# Patient Record
Sex: MALE | Race: WHITE | NOT HISPANIC OR LATINO | Employment: FULL TIME | URBAN - METROPOLITAN AREA
[De-identification: names, ages, dates, MRNs, and addresses within clinical notes are randomized per-mention and may not be internally consistent; named-entity substitution may affect disease eponyms.]

---

## 2020-01-17 ENCOUNTER — APPOINTMENT (OUTPATIENT)
Dept: GENERAL RADIOLOGY | Facility: HOSPITAL | Age: 55
End: 2020-01-17

## 2020-01-17 ENCOUNTER — APPOINTMENT (OUTPATIENT)
Dept: CARDIOLOGY | Facility: HOSPITAL | Age: 55
End: 2020-01-17

## 2020-01-17 ENCOUNTER — TRANSCRIBE ORDERS (OUTPATIENT)
Dept: CARDIAC REHAB | Facility: HOSPITAL | Age: 55
End: 2020-01-17

## 2020-01-17 ENCOUNTER — HOSPITAL ENCOUNTER (INPATIENT)
Facility: HOSPITAL | Age: 55
LOS: 2 days | Discharge: HOME | End: 2020-01-19
Attending: EMERGENCY MEDICINE | Admitting: INTERNAL MEDICINE

## 2020-01-17 DIAGNOSIS — E78.5 HYPERLIPIDEMIA LDL GOAL <70: ICD-10-CM

## 2020-01-17 DIAGNOSIS — I21.21 ST ELEVATION MYOCARDIAL INFARCTION INVOLVING LEFT CIRCUMFLEX CORONARY ARTERY (HCC): Primary | ICD-10-CM

## 2020-01-17 DIAGNOSIS — I21.3 ST ELEVATION MYOCARDIAL INFARCTION (STEMI), UNSPECIFIED ARTERY (HCC): Primary | ICD-10-CM

## 2020-01-17 DIAGNOSIS — I21.3 ST ELEVATION MYOCARDIAL INFARCTION (STEMI), UNSPECIFIED ARTERY (HCC): ICD-10-CM

## 2020-01-17 PROBLEM — I47.29 VENTRICULAR TACHYCARDIA (PAROXYSMAL) (HCC): Status: ACTIVE | Noted: 2020-01-17

## 2020-01-17 PROBLEM — I21.11 ST ELEVATION MYOCARDIAL INFARCTION INVOLVING RIGHT CORONARY ARTERY: Status: ACTIVE | Noted: 2020-01-17

## 2020-01-17 PROBLEM — I47.20 VENTRICULAR TACHYCARDIA (PAROXYSMAL): Status: ACTIVE | Noted: 2020-01-17

## 2020-01-17 LAB
ALBUMIN SERPL-MCNC: 4.4 G/DL (ref 3.5–5.2)
ALBUMIN/GLOB SERPL: 1.5 G/DL
ALP SERPL-CCNC: 64 U/L (ref 39–117)
ALT SERPL W P-5'-P-CCNC: 30 U/L (ref 1–41)
ANION GAP SERPL CALCULATED.3IONS-SCNC: 18 MMOL/L (ref 5–15)
AST SERPL-CCNC: 69 U/L (ref 1–40)
BASOPHILS # BLD AUTO: 0.09 10*3/MM3 (ref 0–0.2)
BASOPHILS NFR BLD AUTO: 0.9 % (ref 0–1.5)
BH CV ECHO MEAS - AO MAX PG (FULL): 2 MMHG
BH CV ECHO MEAS - AO MAX PG: 3.9 MMHG
BH CV ECHO MEAS - AO ROOT AREA (BSA CORRECTED): 2
BH CV ECHO MEAS - AO ROOT AREA: 13.6 CM^2
BH CV ECHO MEAS - AO ROOT DIAM: 4.2 CM
BH CV ECHO MEAS - AO V2 MAX: 98.9 CM/SEC
BH CV ECHO MEAS - AVA(V,A): 3 CM^2
BH CV ECHO MEAS - AVA(V,D): 3 CM^2
BH CV ECHO MEAS - BSA(HAYCOCK): 2 M^2
BH CV ECHO MEAS - BSA: 2 M^2
BH CV ECHO MEAS - BZI_BMI: 25.5 KILOGRAMS/M^2
BH CV ECHO MEAS - BZI_METRIC_HEIGHT: 180.3 CM
BH CV ECHO MEAS - BZI_METRIC_WEIGHT: 83 KG
BH CV ECHO MEAS - EDV(CUBED): 127 ML
BH CV ECHO MEAS - EDV(MOD-SP2): 117 ML
BH CV ECHO MEAS - EDV(MOD-SP4): 124 ML
BH CV ECHO MEAS - EDV(TEICH): 119.7 ML
BH CV ECHO MEAS - EF(CUBED): 66.8 %
BH CV ECHO MEAS - EF(MOD-BP): 61 %
BH CV ECHO MEAS - EF(MOD-SP2): 65.8 %
BH CV ECHO MEAS - EF(MOD-SP4): 53.2 %
BH CV ECHO MEAS - EF(TEICH): 58.1 %
BH CV ECHO MEAS - ESV(CUBED): 42.1 ML
BH CV ECHO MEAS - ESV(MOD-SP2): 40 ML
BH CV ECHO MEAS - ESV(MOD-SP4): 58 ML
BH CV ECHO MEAS - ESV(TEICH): 50.2 ML
BH CV ECHO MEAS - FS: 30.8 %
BH CV ECHO MEAS - IVS/LVPW: 1.1
BH CV ECHO MEAS - IVSD: 0.95 CM
BH CV ECHO MEAS - LA DIMENSION: 3.4 CM
BH CV ECHO MEAS - LA/AO: 0.81
BH CV ECHO MEAS - LAD MAJOR: 6 CM
BH CV ECHO MEAS - LAT PEAK E' VEL: 8.2 CM/SEC
BH CV ECHO MEAS - LATERAL E/E' RATIO: 6.2
BH CV ECHO MEAS - LV DIASTOLIC VOL/BSA (35-75): 61.1 ML/M^2
BH CV ECHO MEAS - LV MASS(C)D: 164.2 GRAMS
BH CV ECHO MEAS - LV MASS(C)DI: 80.8 GRAMS/M^2
BH CV ECHO MEAS - LV MAX PG: 1.9 MMHG
BH CV ECHO MEAS - LV SYSTOLIC VOL/BSA (12-30): 28.6 ML/M^2
BH CV ECHO MEAS - LV V1 MAX: 69.4 CM/SEC
BH CV ECHO MEAS - LVIDD: 5 CM
BH CV ECHO MEAS - LVIDS: 3.5 CM
BH CV ECHO MEAS - LVLD AP2: 8.6 CM
BH CV ECHO MEAS - LVLD AP4: 8.4 CM
BH CV ECHO MEAS - LVLS AP2: 7.5 CM
BH CV ECHO MEAS - LVLS AP4: 7.6 CM
BH CV ECHO MEAS - LVOT AREA (M): 4.2 CM^2
BH CV ECHO MEAS - LVOT AREA: 4.3 CM^2
BH CV ECHO MEAS - LVOT DIAM: 2.3 CM
BH CV ECHO MEAS - LVPWD: 0.89 CM
BH CV ECHO MEAS - MED PEAK E' VEL: 8.5 CM/SEC
BH CV ECHO MEAS - MEDIAL E/E' RATIO: 6
BH CV ECHO MEAS - MV A MAX VEL: 43 CM/SEC
BH CV ECHO MEAS - MV DEC TIME: 0.26 SEC
BH CV ECHO MEAS - MV E MAX VEL: 51.5 CM/SEC
BH CV ECHO MEAS - MV E/A: 1.2
BH CV ECHO MEAS - PA ACC SLOPE: 380.3 CM/SEC^2
BH CV ECHO MEAS - PA ACC TIME: 0.15 SEC
BH CV ECHO MEAS - PA PR(ACCEL): 9.3 MMHG
BH CV ECHO MEAS - RAP SYSTOLE: 3 MMHG
BH CV ECHO MEAS - RVSP: 23 MMHG
BH CV ECHO MEAS - SI(CUBED): 41.8 ML/M^2
BH CV ECHO MEAS - SI(MOD-SP2): 37.9 ML/M^2
BH CV ECHO MEAS - SI(MOD-SP4): 32.5 ML/M^2
BH CV ECHO MEAS - SI(TEICH): 34.3 ML/M^2
BH CV ECHO MEAS - SV(CUBED): 84.9 ML
BH CV ECHO MEAS - SV(MOD-SP2): 77 ML
BH CV ECHO MEAS - SV(MOD-SP4): 66 ML
BH CV ECHO MEAS - SV(TEICH): 69.6 ML
BH CV ECHO MEAS - TAPSE (>1.6): 2.8 CM2
BH CV ECHO MEAS - TR MAX PG: 20 MMHG
BH CV ECHO MEAS - TR MAX VEL: 222 CM/SEC
BH CV ECHO MEAS - TV MAX PG: 15.9 MMHG
BH CV ECHO MEAS - TV V2 MAX: 198.3 CM/SEC
BH CV ECHO MEASUREMENTS AVERAGE E/E' RATIO: 6.17
BH CV VAS BP LEFT ARM: NORMAL MMHG
BH CV XLRA - RV BASE: 3.8 CM
BH CV XLRA - RV LENGTH: 7.3 CM
BH CV XLRA - RV MID: 3.5 CM
BILIRUB SERPL-MCNC: 0.7 MG/DL (ref 0.2–1.2)
BUN BLD-MCNC: 27 MG/DL (ref 6–20)
BUN/CREAT SERPL: 21.8 (ref 7–25)
CALCIUM SPEC-SCNC: 9.7 MG/DL (ref 8.6–10.5)
CHLORIDE SERPL-SCNC: 102 MMOL/L (ref 98–107)
CO2 SERPL-SCNC: 19 MMOL/L (ref 22–29)
CREAT BLD-MCNC: 1.24 MG/DL (ref 0.76–1.27)
DEPRECATED RDW RBC AUTO: 44 FL (ref 37–54)
EOSINOPHIL # BLD AUTO: 0.1 10*3/MM3 (ref 0–0.4)
EOSINOPHIL NFR BLD AUTO: 1.1 % (ref 0.3–6.2)
ERYTHROCYTE [DISTWIDTH] IN BLOOD BY AUTOMATED COUNT: 13 % (ref 12.3–15.4)
GFR SERPL CREATININE-BSD FRML MDRD: 61 ML/MIN/1.73
GLOBULIN UR ELPH-MCNC: 3 GM/DL
GLUCOSE BLD-MCNC: 111 MG/DL (ref 65–99)
HCT VFR BLD AUTO: 43.2 % (ref 37.5–51)
HGB BLD-MCNC: 14.5 G/DL (ref 13–17.7)
HOLD SPECIMEN: NORMAL
HOLD SPECIMEN: NORMAL
IMM GRANULOCYTES # BLD AUTO: 0.02 10*3/MM3 (ref 0–0.05)
IMM GRANULOCYTES NFR BLD AUTO: 0.2 % (ref 0–0.5)
LEFT ATRIUM VOLUME INDEX: 40.4 ML/M^2
LEFT ATRIUM VOLUME: 82 ML
LIPASE SERPL-CCNC: 22 U/L (ref 13–60)
LV EF 2D ECHO EST: 60 %
LYMPHOCYTES # BLD AUTO: 2.95 10*3/MM3 (ref 0.7–3.1)
LYMPHOCYTES NFR BLD AUTO: 31.1 % (ref 19.6–45.3)
MAGNESIUM SERPL-MCNC: 1.7 MG/DL (ref 1.6–2.6)
MCH RBC QN AUTO: 31.1 PG (ref 26.6–33)
MCHC RBC AUTO-ENTMCNC: 33.6 G/DL (ref 31.5–35.7)
MCV RBC AUTO: 92.7 FL (ref 79–97)
MONOCYTES # BLD AUTO: 0.66 10*3/MM3 (ref 0.1–0.9)
MONOCYTES NFR BLD AUTO: 7 % (ref 5–12)
NEUTROPHILS # BLD AUTO: 5.66 10*3/MM3 (ref 1.7–7)
NEUTROPHILS NFR BLD AUTO: 59.7 % (ref 42.7–76)
NRBC BLD AUTO-RTO: 0 /100 WBC (ref 0–0.2)
NT-PROBNP SERPL-MCNC: 41.5 PG/ML (ref 5–900)
PLATELET # BLD AUTO: 201 10*3/MM3 (ref 140–450)
PMV BLD AUTO: 11.5 FL (ref 6–12)
POTASSIUM BLD-SCNC: 4.2 MMOL/L (ref 3.5–5.2)
PROT SERPL-MCNC: 7.4 G/DL (ref 6–8.5)
RBC # BLD AUTO: 4.66 10*6/MM3 (ref 4.14–5.8)
SODIUM BLD-SCNC: 139 MMOL/L (ref 136–145)
TROPONIN T SERPL-MCNC: 0.38 NG/ML (ref 0–0.03)
TROPONIN T SERPL-MCNC: <0.01 NG/ML (ref 0–0.03)
WBC NRBC COR # BLD: 9.48 10*3/MM3 (ref 3.4–10.8)
WHOLE BLOOD HOLD SPECIMEN: NORMAL
WHOLE BLOOD HOLD SPECIMEN: NORMAL

## 2020-01-17 PROCEDURE — 83735 ASSAY OF MAGNESIUM: CPT | Performed by: INTERNAL MEDICINE

## 2020-01-17 PROCEDURE — B2111ZZ FLUOROSCOPY OF MULTIPLE CORONARY ARTERIES USING LOW OSMOLAR CONTRAST: ICD-10-PCS | Performed by: INTERNAL MEDICINE

## 2020-01-17 PROCEDURE — 25010000003 LIDOCAINE 1 % SOLUTION: Performed by: INTERNAL MEDICINE

## 2020-01-17 PROCEDURE — 83690 ASSAY OF LIPASE: CPT | Performed by: EMERGENCY MEDICINE

## 2020-01-17 PROCEDURE — 25010000002 HEPARIN (PORCINE) PER 1000 UNITS: Performed by: EMERGENCY MEDICINE

## 2020-01-17 PROCEDURE — 92941 PRQ TRLML REVSC TOT OCCL AMI: CPT | Performed by: INTERNAL MEDICINE

## 2020-01-17 PROCEDURE — 93005 ELECTROCARDIOGRAM TRACING: CPT | Performed by: EMERGENCY MEDICINE

## 2020-01-17 PROCEDURE — 92973 PRQ TRLUML C MCHN ASP THRMBC: CPT | Performed by: INTERNAL MEDICINE

## 2020-01-17 PROCEDURE — C1887 CATHETER, GUIDING: HCPCS | Performed by: INTERNAL MEDICINE

## 2020-01-17 PROCEDURE — 25010000002 AMIODARONE IN DEXTROSE 5% 150-4.21 MG/100ML-% SOLUTION: Performed by: NURSE PRACTITIONER

## 2020-01-17 PROCEDURE — 80053 COMPREHEN METABOLIC PANEL: CPT | Performed by: EMERGENCY MEDICINE

## 2020-01-17 PROCEDURE — 85025 COMPLETE CBC W/AUTO DIFF WBC: CPT | Performed by: EMERGENCY MEDICINE

## 2020-01-17 PROCEDURE — 71045 X-RAY EXAM CHEST 1 VIEW: CPT

## 2020-01-17 PROCEDURE — 93306 TTE W/DOPPLER COMPLETE: CPT | Performed by: INTERNAL MEDICINE

## 2020-01-17 PROCEDURE — 84484 ASSAY OF TROPONIN QUANT: CPT | Performed by: INTERNAL MEDICINE

## 2020-01-17 PROCEDURE — C1769 GUIDE WIRE: HCPCS | Performed by: INTERNAL MEDICINE

## 2020-01-17 PROCEDURE — C1757 CATH, THROMBECTOMY/EMBOLECT: HCPCS | Performed by: INTERNAL MEDICINE

## 2020-01-17 PROCEDURE — 25010000002 FENTANYL CITRATE (PF) 100 MCG/2ML SOLUTION: Performed by: INTERNAL MEDICINE

## 2020-01-17 PROCEDURE — 25010000002 PHENYLEPHRINE PER 1 ML: Performed by: INTERNAL MEDICINE

## 2020-01-17 PROCEDURE — C1874 STENT, COATED/COV W/DEL SYS: HCPCS | Performed by: INTERNAL MEDICINE

## 2020-01-17 PROCEDURE — 85347 COAGULATION TIME ACTIVATED: CPT

## 2020-01-17 PROCEDURE — 25010000002 EPTIFIBATIDE 20 MG/10ML SOLUTION: Performed by: INTERNAL MEDICINE

## 2020-01-17 PROCEDURE — 99222 1ST HOSP IP/OBS MODERATE 55: CPT | Performed by: INTERNAL MEDICINE

## 2020-01-17 PROCEDURE — 25010000002 MORPHINE PER 10 MG: Performed by: EMERGENCY MEDICINE

## 2020-01-17 PROCEDURE — 83880 ASSAY OF NATRIURETIC PEPTIDE: CPT | Performed by: EMERGENCY MEDICINE

## 2020-01-17 PROCEDURE — 93306 TTE W/DOPPLER COMPLETE: CPT

## 2020-01-17 PROCEDURE — 027034Z DILATION OF CORONARY ARTERY, ONE ARTERY WITH DRUG-ELUTING INTRALUMINAL DEVICE, PERCUTANEOUS APPROACH: ICD-10-PCS | Performed by: INTERNAL MEDICINE

## 2020-01-17 PROCEDURE — 99284 EMERGENCY DEPT VISIT MOD MDM: CPT

## 2020-01-17 PROCEDURE — C1725 CATH, TRANSLUMIN NON-LASER: HCPCS | Performed by: INTERNAL MEDICINE

## 2020-01-17 PROCEDURE — 25010000002 HEPARIN (PORCINE) PER 1000 UNITS: Performed by: INTERNAL MEDICINE

## 2020-01-17 PROCEDURE — B2151ZZ FLUOROSCOPY OF LEFT HEART USING LOW OSMOLAR CONTRAST: ICD-10-PCS | Performed by: INTERNAL MEDICINE

## 2020-01-17 PROCEDURE — 93005 ELECTROCARDIOGRAM TRACING: CPT | Performed by: NURSE PRACTITIONER

## 2020-01-17 PROCEDURE — 0 IOPAMIDOL PER 1 ML: Performed by: INTERNAL MEDICINE

## 2020-01-17 PROCEDURE — 25010000002 ATROPINE PER 0.01 MG: Performed by: INTERNAL MEDICINE

## 2020-01-17 PROCEDURE — 93458 L HRT ARTERY/VENTRICLE ANGIO: CPT | Performed by: INTERNAL MEDICINE

## 2020-01-17 PROCEDURE — 25010000002 MIDAZOLAM PER 1 MG: Performed by: INTERNAL MEDICINE

## 2020-01-17 PROCEDURE — 25010000002 AMIODARONE IN DEXTROSE 5% 360-4.14 MG/200ML-% SOLUTION: Performed by: NURSE PRACTITIONER

## 2020-01-17 PROCEDURE — 4A023N7 MEASUREMENT OF CARDIAC SAMPLING AND PRESSURE, LEFT HEART, PERCUTANEOUS APPROACH: ICD-10-PCS | Performed by: INTERNAL MEDICINE

## 2020-01-17 PROCEDURE — 84484 ASSAY OF TROPONIN QUANT: CPT | Performed by: EMERGENCY MEDICINE

## 2020-01-17 PROCEDURE — C9606 PERC D-E COR REVASC W AMI S: HCPCS | Performed by: INTERNAL MEDICINE

## 2020-01-17 PROCEDURE — 36415 COLL VENOUS BLD VENIPUNCTURE: CPT

## 2020-01-17 PROCEDURE — C1894 INTRO/SHEATH, NON-LASER: HCPCS | Performed by: INTERNAL MEDICINE

## 2020-01-17 PROCEDURE — 93005 ELECTROCARDIOGRAM TRACING: CPT | Performed by: INTERNAL MEDICINE

## 2020-01-17 DEVICE — XIENCE SIERRA™ EVEROLIMUS ELUTING CORONARY STENT SYSTEM 4.00 MM X 23 MM / RAPID-EXCHANGE
Type: IMPLANTABLE DEVICE | Status: FUNCTIONAL
Brand: XIENCE SIERRA™

## 2020-01-17 DEVICE — XIENCE SIERRA™ EVEROLIMUS ELUTING CORONARY STENT SYSTEM 4.00 MM X 38 MM / RAPID-EXCHANGE
Type: IMPLANTABLE DEVICE | Status: FUNCTIONAL
Brand: XIENCE SIERRA™

## 2020-01-17 RX ORDER — ASPIRIN 81 MG/1
81 TABLET, CHEWABLE ORAL DAILY
Status: DISCONTINUED | OUTPATIENT
Start: 2020-01-17 | End: 2020-01-19 | Stop reason: HOSPADM

## 2020-01-17 RX ORDER — SODIUM CHLORIDE 0.9 % (FLUSH) 0.9 %
10 SYRINGE (ML) INJECTION AS NEEDED
Status: DISCONTINUED | OUTPATIENT
Start: 2020-01-17 | End: 2020-01-19 | Stop reason: HOSPADM

## 2020-01-17 RX ORDER — FENTANYL CITRATE 50 UG/ML
INJECTION, SOLUTION INTRAMUSCULAR; INTRAVENOUS AS NEEDED
Status: DISCONTINUED | OUTPATIENT
Start: 2020-01-17 | End: 2020-01-17 | Stop reason: HOSPADM

## 2020-01-17 RX ORDER — HEPARIN SODIUM 5000 [USP'U]/ML
INJECTION, SOLUTION INTRAVENOUS; SUBCUTANEOUS
Status: COMPLETED | OUTPATIENT
Start: 2020-01-17 | End: 2020-01-17

## 2020-01-17 RX ORDER — LISINOPRIL 2.5 MG/1
2.5 TABLET ORAL
Status: DISCONTINUED | OUTPATIENT
Start: 2020-01-17 | End: 2020-01-18

## 2020-01-17 RX ORDER — SODIUM CHLORIDE 0.9 % (FLUSH) 0.9 %
10 SYRINGE (ML) INJECTION EVERY 12 HOURS SCHEDULED
Status: DISCONTINUED | OUTPATIENT
Start: 2020-01-17 | End: 2020-01-19 | Stop reason: HOSPADM

## 2020-01-17 RX ORDER — PRASUGREL 5 MG/1
TABLET, FILM COATED ORAL AS NEEDED
Status: DISCONTINUED | OUTPATIENT
Start: 2020-01-17 | End: 2020-01-17 | Stop reason: HOSPADM

## 2020-01-17 RX ORDER — MIDAZOLAM HYDROCHLORIDE 1 MG/ML
INJECTION INTRAMUSCULAR; INTRAVENOUS AS NEEDED
Status: DISCONTINUED | OUTPATIENT
Start: 2020-01-17 | End: 2020-01-17 | Stop reason: HOSPADM

## 2020-01-17 RX ORDER — CLOPIDOGREL BISULFATE 75 MG/1
TABLET ORAL
Status: COMPLETED | OUTPATIENT
Start: 2020-01-17 | End: 2020-01-17

## 2020-01-17 RX ORDER — ZOLPIDEM TARTRATE 5 MG/1
10 TABLET ORAL NIGHTLY PRN
Status: DISCONTINUED | OUTPATIENT
Start: 2020-01-17 | End: 2020-01-19 | Stop reason: HOSPADM

## 2020-01-17 RX ORDER — POTASSIUM CHLORIDE 750 MG/1
40 CAPSULE, EXTENDED RELEASE ORAL AS NEEDED
Status: DISCONTINUED | OUTPATIENT
Start: 2020-01-17 | End: 2020-01-19 | Stop reason: HOSPADM

## 2020-01-17 RX ORDER — MAGNESIUM SULFATE HEPTAHYDRATE 40 MG/ML
4 INJECTION, SOLUTION INTRAVENOUS AS NEEDED
Status: DISCONTINUED | OUTPATIENT
Start: 2020-01-17 | End: 2020-01-19 | Stop reason: HOSPADM

## 2020-01-17 RX ORDER — MAGNESIUM SULFATE HEPTAHYDRATE 40 MG/ML
2 INJECTION, SOLUTION INTRAVENOUS AS NEEDED
Status: DISCONTINUED | OUTPATIENT
Start: 2020-01-17 | End: 2020-01-19 | Stop reason: HOSPADM

## 2020-01-17 RX ORDER — EPTIFIBATIDE 2 MG/ML
INJECTION, SOLUTION INTRAVENOUS AS NEEDED
Status: DISCONTINUED | OUTPATIENT
Start: 2020-01-17 | End: 2020-01-17 | Stop reason: HOSPADM

## 2020-01-17 RX ORDER — HEPARIN SODIUM 1000 [USP'U]/ML
INJECTION, SOLUTION INTRAVENOUS; SUBCUTANEOUS AS NEEDED
Status: DISCONTINUED | OUTPATIENT
Start: 2020-01-17 | End: 2020-01-17 | Stop reason: HOSPADM

## 2020-01-17 RX ORDER — NITROGLYCERIN 0.4 MG/1
TABLET SUBLINGUAL
Status: COMPLETED | OUTPATIENT
Start: 2020-01-17 | End: 2020-01-17

## 2020-01-17 RX ORDER — ALPRAZOLAM 0.5 MG/1
0.5 TABLET ORAL EVERY 8 HOURS PRN
Status: DISCONTINUED | OUTPATIENT
Start: 2020-01-17 | End: 2020-01-19 | Stop reason: HOSPADM

## 2020-01-17 RX ORDER — PRASUGREL 10 MG/1
10 TABLET, FILM COATED ORAL DAILY
Status: DISCONTINUED | OUTPATIENT
Start: 2020-01-18 | End: 2020-01-19 | Stop reason: HOSPADM

## 2020-01-17 RX ORDER — NITROGLYCERIN 0.4 MG/1
0.4 TABLET SUBLINGUAL
Status: DISCONTINUED | OUTPATIENT
Start: 2020-01-17 | End: 2020-01-19 | Stop reason: HOSPADM

## 2020-01-17 RX ORDER — ATROPINE SULFATE 1 MG/ML
INJECTION, SOLUTION INTRAMUSCULAR; INTRAVENOUS; SUBCUTANEOUS AS NEEDED
Status: DISCONTINUED | OUTPATIENT
Start: 2020-01-17 | End: 2020-01-17 | Stop reason: HOSPADM

## 2020-01-17 RX ORDER — LIDOCAINE HYDROCHLORIDE 10 MG/ML
INJECTION, SOLUTION INFILTRATION; PERINEURAL AS NEEDED
Status: DISCONTINUED | OUTPATIENT
Start: 2020-01-17 | End: 2020-01-17 | Stop reason: HOSPADM

## 2020-01-17 RX ORDER — SODIUM CHLORIDE 9 MG/ML
3 INJECTION, SOLUTION INTRAVENOUS CONTINUOUS
Status: DISCONTINUED | OUTPATIENT
Start: 2020-01-17 | End: 2020-01-17

## 2020-01-17 RX ORDER — MORPHINE SULFATE 4 MG/ML
4 INJECTION, SOLUTION INTRAMUSCULAR; INTRAVENOUS ONCE
Status: COMPLETED | OUTPATIENT
Start: 2020-01-17 | End: 2020-01-17

## 2020-01-17 RX ORDER — ACETAMINOPHEN 325 MG/1
650 TABLET ORAL EVERY 4 HOURS PRN
Status: DISCONTINUED | OUTPATIENT
Start: 2020-01-17 | End: 2020-01-19 | Stop reason: HOSPADM

## 2020-01-17 RX ORDER — ASPIRIN 81 MG/1
TABLET, CHEWABLE ORAL
Status: COMPLETED | OUTPATIENT
Start: 2020-01-17 | End: 2020-01-17

## 2020-01-17 RX ORDER — ASPIRIN 81 MG/1
324 TABLET, CHEWABLE ORAL ONCE
Status: DISCONTINUED | OUTPATIENT
Start: 2020-01-17 | End: 2020-01-17

## 2020-01-17 RX ORDER — HYDROCODONE BITARTRATE AND ACETAMINOPHEN 7.5; 325 MG/1; MG/1
1 TABLET ORAL EVERY 4 HOURS PRN
Status: DISCONTINUED | OUTPATIENT
Start: 2020-01-17 | End: 2020-01-19 | Stop reason: HOSPADM

## 2020-01-17 RX ORDER — POTASSIUM CHLORIDE 1.5 G/1.77G
40 POWDER, FOR SOLUTION ORAL AS NEEDED
Status: DISCONTINUED | OUTPATIENT
Start: 2020-01-17 | End: 2020-01-19 | Stop reason: HOSPADM

## 2020-01-17 RX ORDER — ROSUVASTATIN CALCIUM 20 MG/1
20 TABLET, COATED ORAL NIGHTLY
Status: DISCONTINUED | OUTPATIENT
Start: 2020-01-17 | End: 2020-01-19 | Stop reason: HOSPADM

## 2020-01-17 RX ADMIN — AMIODARONE HYDROCHLORIDE 0.5 MG/MIN: 1.8 INJECTION, SOLUTION INTRAVENOUS at 21:01

## 2020-01-17 RX ADMIN — ASPIRIN 81 MG CHEWABLE TABLET 324 MG: 81 TABLET CHEWABLE at 05:54

## 2020-01-17 RX ADMIN — MORPHINE SULFATE 4 MG: 4 INJECTION, SOLUTION INTRAMUSCULAR; INTRAVENOUS at 06:34

## 2020-01-17 RX ADMIN — AMIODARONE HYDROCHLORIDE 1 MG/MIN: 1.8 INJECTION, SOLUTION INTRAVENOUS at 12:32

## 2020-01-17 RX ADMIN — NITROGLYCERIN 0.4 MG: 0.4 TABLET SUBLINGUAL at 06:12

## 2020-01-17 RX ADMIN — CLOPIDOGREL BISULFATE 600 MG: 75 TABLET ORAL at 05:54

## 2020-01-17 RX ADMIN — MAGNESIUM SULFATE 4 G: 4 INJECTION INTRAVENOUS at 12:47

## 2020-01-17 RX ADMIN — HEPARIN SODIUM 4992 UNITS: 5000 INJECTION, SOLUTION INTRAVENOUS; SUBCUTANEOUS at 06:09

## 2020-01-17 RX ADMIN — SODIUM CHLORIDE, PRESERVATIVE FREE 10 ML: 5 INJECTION INTRAVENOUS at 21:01

## 2020-01-17 RX ADMIN — ROSUVASTATIN CALCIUM 20 MG: 20 TABLET, COATED ORAL at 21:01

## 2020-01-17 RX ADMIN — AMIODARONE HYDROCHLORIDE 150 MG: 1.5 INJECTION, SOLUTION INTRAVENOUS at 12:17

## 2020-01-17 RX ADMIN — SODIUM CHLORIDE 500 ML: 9 INJECTION, SOLUTION INTRAVENOUS at 12:46

## 2020-01-17 RX ADMIN — AMIODARONE HYDROCHLORIDE 1 MG/MIN: 1.8 INJECTION, SOLUTION INTRAVENOUS at 17:20

## 2020-01-17 NOTE — NURSING NOTE
At around 0930 patient began having frequent PVCs and short runs of VT. Patient was asymptomatic with episodes. EKG was obtained at 1007. Notified APRN.  Around 1145 patient had 24 beat run of VT. Notified APRN. Amio gtt started, Mag replaced and NS bolus infused per orders. EKG obtained. Patient had also been having episodes of accelerated idioventricular rhythm as well and he had some complaints of palpitations. Another small VT run occurred around 1400. EKG obtained. Notified APRN. PA showed up to bedside and review rhythms and speak with patient around 1420. At 1448, Dr. Cox called nursing. It was determined that he is experiencing re-perfusion rhythm and APRN should not be paged anymore. All of this is also documented in Adult PCS system charting. Will continue to monitor.

## 2020-01-17 NOTE — PROGRESS NOTES
Pt. Referred for Phase II Cardiac Rehab. Staff discussed benefits of exercise, program protocol, and educational material provided. Teach back verified.  Patient scheduled for orientation at [facility] on Friday 2/7/2020 at 1300.

## 2020-01-17 NOTE — ED PROVIDER NOTES
"Subjective   54-year-old male, otherwise healthy, presents for evaluation of chest pain.  Of note, the patient's only cardiac risk factors are strong family history and being a former smoker.  He states that this morning between 415 and 430 he was working out when he began experiencing central chest pain that he describes as a \"pressure.\"  He states that he had some mild pain in his left jaw and left hand as well.  He endorses accompanying shortness of breath, too.  No nausea or vomiting.  No diaphoresis.  He states that the pain was persistent, prompting his visit to the emergency department this morning.  No similar episodes before in the past.          Review of Systems   Respiratory: Positive for shortness of breath.    Cardiovascular: Positive for chest pain.   All other systems reviewed and are negative.      History reviewed. No pertinent past medical history.    Allergies   Allergen Reactions   • Erythromycin        Past Surgical History:   Procedure Laterality Date   • ROTATOR CUFF REPAIR Left        History reviewed. No pertinent family history.    Social History     Socioeconomic History   • Marital status:      Spouse name: Not on file   • Number of children: Not on file   • Years of education: Not on file   • Highest education level: Not on file   Tobacco Use   • Smoking status: Former Smoker   • Smokeless tobacco: Never Used   Substance and Sexual Activity   • Alcohol use: Defer   • Drug use: No   • Sexual activity: Defer           Objective   Physical Exam   Constitutional: He is oriented to person, place, and time. He appears well-developed and well-nourished. No distress.   Nontoxic-appearing male   HENT:   Head: Normocephalic and atraumatic.   Mouth/Throat: Oropharynx is clear and moist.   Neck: Normal range of motion. No JVD present.   Cardiovascular: Normal rate, regular rhythm, normal heart sounds, intact distal pulses and normal pulses. Exam reveals no gallop and no friction rub.   No " murmur heard.  Pulmonary/Chest: Effort normal and breath sounds normal. No respiratory distress. He has no wheezes. He has no rales.   Abdominal: Soft. Bowel sounds are normal. He exhibits no distension and no mass. There is no tenderness. There is no guarding.   Musculoskeletal: Normal range of motion.        Right lower leg: He exhibits no edema.        Left lower leg: He exhibits no edema.   Neurological: He is alert and oriented to person, place, and time.   Skin: Skin is warm and dry. No rash noted. He is not diaphoretic. No erythema. No pallor.   Psychiatric: He has a normal mood and affect. Judgment and thought content normal.   Nursing note and vitals reviewed.      Critical Care  Performed by: Encoh Castillo MD  Authorized by: Enoch Castillo MD     Critical care provider statement:     Critical care time (minutes):  35    Critical care was necessary to treat or prevent imminent or life-threatening deterioration of the following conditions:  Cardiac failure    Critical care was time spent personally by me on the following activities:  Development of treatment plan with patient or surrogate, discussions with consultants, evaluation of patient's response to treatment, examination of patient, obtaining history from patient or surrogate, ordering and performing treatments and interventions, ordering and review of laboratory studies, ordering and review of radiographic studies, pulse oximetry and re-evaluation of patient's condition               ED Course  ED Course as of Jan 17 0614 Fri Jan 17, 2020   0613 54-year-old male presents for evaluation of chest pain this morning.  Of note, the patient has cardiac risk factors of strong family history and is a former smoker.  On arrival to the ED, patient nontoxic-appearing.  EKG revealed sinus bradycardia with sinus arrhythmia and ST elevation and inferior and lateral precordial leads with reciprocal depression in anterior precordial leads consistent  with STEMI.  STEMI protocol initiated.  Patient given aspirin, Plavix, and heparin.  Nitroglycerin given as well.  Labs and chest x-ray are currently pending.  I discussed the patient's case with Dr. Cox, and the patient will be taken immediately to the Cath Lab for definitive management of his STEMI.  He is hemodynamically stable at this time and aware/agreeable with the plan.    [DD]      ED Course User Index  [DD] Enoch Castillo MD                      E[place] Coma Scale Score: 15                  Recent Results (from the past 24 hour(s))   CBC Auto Differential    Collection Time: 01/17/20  5:53 AM   Result Value Ref Range    WBC 9.48 3.40 - 10.80 10*3/mm3    RBC 4.66 4.14 - 5.80 10*6/mm3    Hemoglobin 14.5 13.0 - 17.7 g/dL    Hematocrit 43.2 37.5 - 51.0 %    MCV 92.7 79.0 - 97.0 fL    MCH 31.1 26.6 - 33.0 pg    MCHC 33.6 31.5 - 35.7 g/dL    RDW 13.0 12.3 - 15.4 %    RDW-SD 44.0 37.0 - 54.0 fl    MPV 11.5 6.0 - 12.0 fL    Platelets 201 140 - 450 10*3/mm3    Neutrophil % 59.7 42.7 - 76.0 %    Lymphocyte % 31.1 19.6 - 45.3 %    Monocyte % 7.0 5.0 - 12.0 %    Eosinophil % 1.1 0.3 - 6.2 %    Basophil % 0.9 0.0 - 1.5 %    Immature Grans % 0.2 0.0 - 0.5 %    Neutrophils, Absolute 5.66 1.70 - 7.00 10*3/mm3    Lymphocytes, Absolute 2.95 0.70 - 3.10 10*3/mm3    Monocytes, Absolute 0.66 0.10 - 0.90 10*3/mm3    Eosinophils, Absolute 0.10 0.00 - 0.40 10*3/mm3    Basophils, Absolute 0.09 0.00 - 0.20 10*3/mm3    Immature Grans, Absolute 0.02 0.00 - 0.05 10*3/mm3    nRBC 0.0 0.0 - 0.2 /100 WBC     Note: In addition to lab results from this visit, the labs listed above may include labs taken at another facility or during a different encounter within the last 24 hours. Please correlate lab times with ED admission and discharge times for further clarification of the services performed during this visit.    XR Chest 1 View    (Results Pending)     Vitals:    01/17/20 0601 01/17/20 0604 01/17/20 0611 01/17/20 0614   BP:   (!) 142/102  134/92   BP Location:       Patient Position:       Pulse: 68 54 72 57   Resp:  18  14   Temp:  97.5 °F (36.4 °C)     TempSrc:       SpO2: 100% 100%  100%   Weight:       Height:         Medications   sodium chloride 0.9 % flush 10 mL (has no administration in time range)   aspirin chewable tablet 324 mg (has no administration in time range)   heparin (porcine) 5000 UNIT/ML injection (4,992 Units Intravenous Given 1/17/20 0609)   nitroglycerin (NITROSTAT) SL tablet (0.4 mg Sublingual Given 1/17/20 0612)   nitroglycerin (NITROSTAT) SL tablet 0.4 mg (has no administration in time range)   Morphine sulfate (PF) injection 4 mg (has no administration in time range)   aspirin chewable tablet (324 mg Oral Given 1/17/20 0554)   clopidogrel (PLAVIX) tablet (600 mg Oral Given 1/17/20 0554)     ECG/EMG Results (last 24 hours)     Procedure Component Value Units Date/Time    ECG 12 Lead [[id number]] Collected:  01/17/20 0550     Updated:  01/17/20 0551        ECG 12 Lead                     [facility]    Final diagnoses:   ST elevation myocardial infarction (STEMI), unspecified artery (CMS/HCC)            Enoch Castillo MD  01/17/20 0615

## 2020-01-17 NOTE — H&P
Cardiology H & P Note  [facility]      Chief Complaint/Reason for service:    · STEMI        IDENTIFICATION: 54-year-old gentleman from [facility] Problems    Diagnosis POA   • **ST elevation myocardial infarction involving left circumflex coronary artery (CMS/HCC) [I21.21] Yes     Priority: High     · Cardiac catheterization for inferior lateral STEMI (1/17/2020): 100% thrombotic occlusion of the mid circumflex status post overlapping Xience OTONIEL.  Mild disease of LAD/RCA.  LVEF 42%     • Hyperlipidemia LDL goal <70 [E78.5] Yes     · High intensity statin therapy indicated given the presence of CAD                54-year-old gentleman with no prior cardiac history presented to [facility] emergency room around 0540 with complaints of acute severe substernal chest pain while working out.  In the ER, he was found to have inferior lateral ST elevation on EKG.  Code AMI was activated.  The patient was given aspirin, clopidogrel 600 mg loading dose.    The patient was taken emergently to the cardiac catheterization lab.  He was found to have 100% occlusion of large left circumflex with a large thrombus burden present.  Thrombus was treated with mechanical thrombectomy and 2 drug-eluting stents were placed with excellent final results.  LVEF 42%    Allergies   Allergen Reactions   • Erythromycin        History reviewed. No pertinent past medical history.    Past Surgical History:   Procedure Laterality Date   • ROTATOR CUFF REPAIR Left        History reviewed. No pertinent family history.    Social History     Tobacco Use   Smoking Status Former Smoker   Smokeless Tobacco Never Used       Social History     Substance and Sexual Activity   Alcohol Use Defer         Review of Systems:   Deferred due to the emergent nature of the procedure.         Vital Sign Min/Max for last 24 hours  Temp  Min: 97.5 °F (36.4 °C)  Max: 97.5 °F (36.4 °C)   BP  Min: 100/66  Max:  142/102   Pulse  Min: 50  Max: 82   Resp  Min: 14  Max: 20   SpO2  Min: 96 %  Max: 100 %   No data recorded    No intake or output data in the 24 hours ending 01/17/20 0758        Physical Exam   Constitutional: He is oriented to person, place, and time. He appears well-developed and well-nourished.   HENT:   Head: Normocephalic and atraumatic.   Eyes: Conjunctivae are normal. No scleral icterus.   Neck: Normal range of motion. No JVD present. Carotid bruit is not present. No thyromegaly present.   Cardiovascular: Normal rate and regular rhythm. Exam reveals no gallop.   No murmur heard.  Pulmonary/Chest: Effort normal and breath sounds normal.   Abdominal: Soft. He exhibits no distension and no mass. There is no hepatosplenomegaly.   Musculoskeletal: He exhibits no edema.   Neurological: He is alert and oriented to person, place, and time.   Skin: Skin is warm and dry. No rash noted.   Psychiatric: He has a normal mood and affect. His behavior is normal.        Tele: Sinus    DATA REVIEW:    EKG (1/17/2020): Sinus with ST elevation in leads II, III, aVF and V5/V6      Results from last 7 days   Lab Units 01/17/20  0553   SODIUM mmol/L 139   POTASSIUM mmol/L 4.2   CHLORIDE mmol/L 102   BUN mg/dL 27*   CREATININE mg/dL 1.24     Results from last 7 days   Lab Units 01/17/20  0553   TROPONIN T ng/mL <0.010     Results from last 7 days   Lab Units 01/17/20  0553   WBC 10*3/mm3 9.48   HEMOGLOBIN g/dL 14.5   HEMATOCRIT % 43.2   PLATELETS 10*3/mm3 201              STEMI involving left circumflex coronary artery  · status post mechanical thrombectomy and OTONIEL to the left circumflex, 1/17/2020  · DAPT with aspirin and Prasugrel until 2/2021  · Attempt low-dose metoprolol and ACE inhibitor therapy    Hyperlipidemia with goal LDL less than 70  · Start rosuvastatin 20 mg nightly    Ischemic cardiomyopathy with LVEF 42%  · Start low-dose metoprolol and lisinopril  · Transition to metoprolol succinate if blood pressure/heart rate  tolerate         · Admit to telemetry  · DAPT  · Metoprolol 12.5 mg twice daily, hold for SBP <100 or HR <55  · Lisinopril 2.5 mg daily, hold for SBP <110 mmHg  · Echo  · Discharge home in 1-2 days    Shakeel Cox IV, MD  1/17/2020

## 2020-01-17 NOTE — PROGRESS NOTES
Discharge Planning Assessment  [facility]     Patient Name: Manuel Sauceda  MRN: [id number]  Today's Date: 1/17/2020    Admit Date: 1/17/2020    Discharge Needs Assessment     Row Name 01/17/20 1154       Living Environment    Lives With  spouse    Current Living Arrangements  home/apartment/condo    Primary Care Provided by  self    Provides Primary Care For  no one    Family Caregiver if Needed  none    Quality of Family Relationships  helpful;involved    Able to Return to Prior Arrangements  yes       Resource/Environmental Concerns    Resource/Environmental Concerns  none    Transportation Concerns  car, none       Transition Planning    Patient/Family Anticipates Transition to  home;home with family    Patient/Family Anticipated Services at Transition  none    Transportation Anticipated  family or friend will provide       Discharge Needs Assessment    Readmission Within the Last 30 Days  no previous admission in last 30 days    Concerns to be Addressed  discharge planning    Equipment Currently Used at Home  none    Anticipated Changes Related to Illness  none    Equipment Needed After Discharge  none        Discharge Plan     Row Name 01/17/20 1154       Plan    Plan  Home at discharge     Patient/Family in Agreement with Plan  yes    Plan Comments  Met with patient at bedside to discuss discharge planning - He states he is independent of ADL's and lives withi his spouse in [facility]. He denies the use of any DME at home and has no identified needs for home health. Cardiac rehab team has seen the patient and arranged appointment for Cardiac Rehab. He is in need of a PCP, spoke with Rose at [facility], she can not arrange a new patient appointment until day of discharge but will be following, and if he discharges over the weekend, they will arrange a PCP appointment during his transitional care call. No other needs at this time- rachel [phone]         Destination      Coordination has not been started for this  encounter.      Durable Medical Equipment      Coordination has not been started for this encounter.      Dialysis/Infusion      Coordination has not been started for this encounter.      Home Medical Care      Coordination has not been started for this encounter.      Therapy      Coordination has not been started for this encounter.      Community Resources      Coordination has not been started for this encounter.        Expected Discharge Date and Time     Expected Discharge Date Expected Discharge Time    Jan 19, 2020         Demographic Summary     Row Name 01/17/20 1152       General Information    Admission Type  inpatient    Arrived From  home    Referral Source  admission list    Reason for Consult  discharge planning    Preferred Language  English       Contact Information    Permission Granted to Share Info With          Functional Status     Row Name 01/17/20 1154       Functional Status    Usual Activity Tolerance  good    Current Activity Tolerance  good       Functional Status, IADL    Medications  independent    Meal Preparation  independent    Housekeeping  independent    Laundry  independent    Shopping  independent       Mental Status    General Appearance WDL  WDL       Mental Status Summary    Recent Changes in Mental Status/Cognitive Functioning  no changes        Psychosocial    No documentation.       Abuse/Neglect    No documentation.       Legal    No documentation.       Substance Abuse    No documentation.       Patient Forms    No documentation.           Roxy Sinclair RN

## 2020-01-17 NOTE — PROGRESS NOTES
Cardiology update:    Nursing for this patient has contacted my APRN several occasions today for concerns of wide-complex tachycardia.  The patient has had several salvos of short-lived wide-complex rhythm which appears to be idioventricular rhythm.  He has had some short nonsustained VT.  The patient reports some occasional palpitations but otherwise feels well.    Amiodarone has been initiated by my APRN and should be continued unless patient develops low heart rate or blood pressure as a result.    YOSI Cox MD [facility], [facility]  Interventional and General Cardiology

## 2020-01-17 NOTE — PLAN OF CARE
SBP on lower end, kary with PVCs/accelerated idioventricular rhythm present. Denies pain. Radial site without complications. Will monitor.

## 2020-01-18 LAB
ANION GAP SERPL CALCULATED.3IONS-SCNC: 10 MMOL/L (ref 5–15)
BUN BLD-MCNC: 15 MG/DL (ref 6–20)
BUN/CREAT SERPL: 15.3 (ref 7–25)
CALCIUM SPEC-SCNC: 9.1 MG/DL (ref 8.6–10.5)
CHLORIDE SERPL-SCNC: 105 MMOL/L (ref 98–107)
CO2 SERPL-SCNC: 23 MMOL/L (ref 22–29)
CREAT BLD-MCNC: 0.98 MG/DL (ref 0.76–1.27)
DEPRECATED RDW RBC AUTO: 46.4 FL (ref 37–54)
ERYTHROCYTE [DISTWIDTH] IN BLOOD BY AUTOMATED COUNT: 13.2 % (ref 12.3–15.4)
GFR SERPL CREATININE-BSD FRML MDRD: 80 ML/MIN/1.73
GLUCOSE BLD-MCNC: 105 MG/DL (ref 65–99)
HCT VFR BLD AUTO: 40.4 % (ref 37.5–51)
HGB BLD-MCNC: 13.5 G/DL (ref 13–17.7)
MAGNESIUM SERPL-MCNC: 2 MG/DL (ref 1.6–2.6)
MCH RBC QN AUTO: 32 PG (ref 26.6–33)
MCHC RBC AUTO-ENTMCNC: 33.4 G/DL (ref 31.5–35.7)
MCV RBC AUTO: 95.7 FL (ref 79–97)
PLATELET # BLD AUTO: 155 10*3/MM3 (ref 140–450)
PMV BLD AUTO: 11.9 FL (ref 6–12)
POTASSIUM BLD-SCNC: 4.1 MMOL/L (ref 3.5–5.2)
RBC # BLD AUTO: 4.22 10*6/MM3 (ref 4.14–5.8)
SODIUM BLD-SCNC: 138 MMOL/L (ref 136–145)
WBC NRBC COR # BLD: 8.77 10*3/MM3 (ref 3.4–10.8)

## 2020-01-18 PROCEDURE — 93005 ELECTROCARDIOGRAM TRACING: CPT | Performed by: INTERNAL MEDICINE

## 2020-01-18 PROCEDURE — 83735 ASSAY OF MAGNESIUM: CPT | Performed by: INTERNAL MEDICINE

## 2020-01-18 PROCEDURE — 25010000002 ENOXAPARIN PER 10 MG: Performed by: INTERNAL MEDICINE

## 2020-01-18 PROCEDURE — 80048 BASIC METABOLIC PNL TOTAL CA: CPT | Performed by: INTERNAL MEDICINE

## 2020-01-18 PROCEDURE — 85027 COMPLETE CBC AUTOMATED: CPT | Performed by: INTERNAL MEDICINE

## 2020-01-18 PROCEDURE — 99232 SBSQ HOSP IP/OBS MODERATE 35: CPT | Performed by: INTERNAL MEDICINE

## 2020-01-18 PROCEDURE — 93010 ELECTROCARDIOGRAM REPORT: CPT | Performed by: INTERNAL MEDICINE

## 2020-01-18 PROCEDURE — 83036 HEMOGLOBIN GLYCOSYLATED A1C: CPT | Performed by: NURSE PRACTITIONER

## 2020-01-18 RX ADMIN — ASPIRIN 81 MG 81 MG: 81 TABLET ORAL at 09:33

## 2020-01-18 RX ADMIN — ROSUVASTATIN CALCIUM 20 MG: 20 TABLET, COATED ORAL at 19:50

## 2020-01-18 RX ADMIN — ACETAMINOPHEN 650 MG: 325 TABLET, FILM COATED ORAL at 19:45

## 2020-01-18 RX ADMIN — SODIUM CHLORIDE, PRESERVATIVE FREE 10 ML: 5 INJECTION INTRAVENOUS at 19:50

## 2020-01-18 RX ADMIN — ENOXAPARIN SODIUM 40 MG: 40 INJECTION SUBCUTANEOUS at 09:32

## 2020-01-18 RX ADMIN — PRASUGREL HYDROCHLORIDE 10 MG: 10 TABLET, FILM COATED ORAL at 09:33

## 2020-01-18 NOTE — PLAN OF CARE
Problem: Patient Care Overview  Goal: Plan of Care Review  Outcome: Ongoing (interventions implemented as appropriate)  Flowsheets  Taken 1/18/2020 0517  Progress: no change  Taken 1/17/2020 2058  Plan of Care Reviewed With: patient  Note:   VSS. No complaints noted during shift. Left radial site WNL. IV amio currently infusing. Has been Sinus Paulo on monitor in 40's-low 50's, with PVC's. Appeared to have idioventricular rhythm briefly earlier in shift. Has continued to have runs of V-Tach; longest this shift was 10 beats; which was 2046. Runs have been minimal since then.

## 2020-01-18 NOTE — PROGRESS NOTES
"  [facility] at [facility]   Inpatient Progress Note       LOS: 1 day   Patient Care Team:  Provider, No Known as PCP - General    Chief Complaint:  Follow-up for STEMI and VT    Subjective     Interval History:     Patient feels good.  Sitting in chair.  Wishes to go home.  No further chest pain.  No dizziness.    Review of Systems:   Pertinent positives noted in history, exam, and assessment. Otherwise reviewed and negative.      Objective     Vitals:  Blood pressure (!) 77/54, pulse 52, temperature 98 °F (36.7 °C), temperature source Oral, resp. rate 16, height 180.3 cm (71\"), weight 83 kg (183 lb), SpO2 98 %.     Intake/Output Summary (Last 24 hours) at 1/18/2020 1206  Last data filed at 1/18/2020 0900  Gross per 24 hour   Intake 1340 ml   Output --   Net 1340 ml     Physical Exam   Constitutional: He is oriented to person, place, and time. He appears well-developed and well-nourished.   HENT:   Mouth/Throat: Oropharynx is clear and moist.   Neck: No JVD present. Carotid bruit is not present. No thyromegaly present.   Cardiovascular: Regular rhythm, S1 normal, S2 normal, normal heart sounds and intact distal pulses. Exam reveals no gallop, no S3 and no S4.   No murmur heard.  Pulses:       Carotid pulses are 2+ on the right side, and 2+ on the left side.       Radial pulses are 2+ on the right side, and 2+ on the left side.   Pulmonary/Chest: Breath sounds normal.   Abdominal: Soft. Bowel sounds are normal. He exhibits no mass. There is no tenderness.   Musculoskeletal: He exhibits no edema.   Radial artery site benign.   Neurological: He is alert and oriented to person, place, and time.   Skin: Skin is warm and dry. No rash noted.          Results Review:     I reviewed the patient's new clinical results.    Results from last 7 days   Lab Units 01/18/20  0723   WBC 10*3/mm3 8.77   HEMOGLOBIN g/dL 13.5   HEMATOCRIT % 40.4   PLATELETS 10*3/mm3 155     Results from last 7 days   Lab Units " 01/18/20  0723 01/17/20  0553   SODIUM mmol/L 138 139   POTASSIUM mmol/L 4.1 4.2   CHLORIDE mmol/L 105 102   CO2 mmol/L 23.0 19.0*   BUN mg/dL 15 27*   CREATININE mg/dL 0.98 1.24   CALCIUM mg/dL 9.1 9.7   BILIRUBIN mg/dL  --  0.7   ALK PHOS U/L  --  64   ALT (SGPT) U/L  --  30   AST (SGOT) U/L  --  69*   GLUCOSE mg/dL 105* 111*     Results from last 7 days   Lab Units 01/18/20  0723   SODIUM mmol/L 138   POTASSIUM mmol/L 4.1   CHLORIDE mmol/L 105   CO2 mmol/L 23.0   BUN mg/dL 15   CREATININE mg/dL 0.98   GLUCOSE mg/dL 105*   CALCIUM mg/dL 9.1         No results found for: TROPONINI          Results from last 7 days   Lab Units 01/17/20  0553   PROBNP pg/mL 41.5     LHC:  · Severe 1-vessel CAD involving a 100% thrombotic occlusion of the circumflex. This was the culprit for the patient's inferior lateral STEMI.  · LVEF 42% with inferior and lateral hypokinesis  · Normal LV filling pressure  · Partially successful manual thrombectomy and successful mechanical thrombectomy of the circumflex  · Successful PCI of the left circumflex into OM 3 using overlapping Xience drug-eluting stents.    ECHO:  · Left ventricular systolic function is normal. Estimated EF = 60%.  · The following left ventricular wall segments are hypokinetic: basal anterolateral, mid anterolateral, apical lateral, basal inferolateral and mid inferolateral.  · Left atrial cavity size is dilated.  · Mild mitral valve regurgitation is present    Tele:  SR    Assessment/Plan       ST elevation myocardial infarction involving left circumflex coronary artery (CMS/HCC)    Hyperlipidemia LDL goal <70    Ventricular tachycardia (paroxysmal) (CMS/HCC)      1. STEMI  1. S/p intervention to CX  2. Normal EF by echo  3. On ASA, Effient, statin, ACE, and BB  2. Dyslipidemia  1. On high intensity statin  3. VT/WCT, AI VR, likely reperfusion arrhythmias occurred within the first 24 hours of MI.  Would not need long-term treatment, we will simply watch for now.  Will  discontinue amiodarone    Plan:  1.  Discontinue amiodarone, continue beta-blocker.  2.  Continue dual antiplatelet therapy high-dose statin.  3.  Observe rhythm another 24 hours.  Electronically signed by BEATRIZ Clark, 01/18/20, 12:10 PM.    I have seen and examined the patient, I have reviewed the note, discussed the case with the advance practice clinician, made necessary changes and I agree with the final note.    Pb Serra MD  01/18/20  1:34 PM        Dictated utilizing Dragon dictation

## 2020-01-19 VITALS
HEIGHT: 71 IN | WEIGHT: 183 LBS | OXYGEN SATURATION: 96 % | RESPIRATION RATE: 16 BRPM | SYSTOLIC BLOOD PRESSURE: 110 MMHG | BODY MASS INDEX: 25.62 KG/M2 | DIASTOLIC BLOOD PRESSURE: 52 MMHG | TEMPERATURE: 98.1 F | HEART RATE: 50 BPM

## 2020-01-19 LAB
CHOLEST SERPL-MCNC: 147 MG/DL (ref 0–200)
HBA1C MFR BLD: 5.3 % (ref 4.8–5.6)
HDLC SERPL-MCNC: 45 MG/DL (ref 40–60)
LDLC SERPL CALC-MCNC: 86 MG/DL (ref 0–100)
LDLC/HDLC SERPL: 1.92 {RATIO}
MAGNESIUM SERPL-MCNC: 1.9 MG/DL (ref 1.6–2.6)
TRIGL SERPL-MCNC: 78 MG/DL (ref 0–150)
VLDLC SERPL-MCNC: 15.6 MG/DL

## 2020-01-19 PROCEDURE — 83735 ASSAY OF MAGNESIUM: CPT | Performed by: INTERNAL MEDICINE

## 2020-01-19 PROCEDURE — 25010000002 ENOXAPARIN PER 10 MG: Performed by: INTERNAL MEDICINE

## 2020-01-19 PROCEDURE — 99238 HOSP IP/OBS DSCHRG MGMT 30/<: CPT | Performed by: INTERNAL MEDICINE

## 2020-01-19 PROCEDURE — 80061 LIPID PANEL: CPT | Performed by: NURSE PRACTITIONER

## 2020-01-19 RX ORDER — PRASUGREL 10 MG/1
10 TABLET, FILM COATED ORAL DAILY
Qty: 30 TABLET | Refills: 11 | Status: SHIPPED | OUTPATIENT
Start: 2020-01-19 | End: 2020-01-20

## 2020-01-19 RX ORDER — NITROGLYCERIN 0.4 MG/1
0.4 TABLET SUBLINGUAL
Qty: 25 TABLET | Refills: 0 | Status: SHIPPED | OUTPATIENT
Start: 2020-01-19 | End: 2020-05-12 | Stop reason: SDUPTHER

## 2020-01-19 RX ORDER — ASPIRIN 81 MG/1
81 TABLET, CHEWABLE ORAL DAILY
Qty: 90 TABLET | Refills: 0 | Status: SHIPPED | OUTPATIENT
Start: 2020-01-19 | End: 2020-12-08 | Stop reason: SDUPTHER

## 2020-01-19 RX ORDER — ROSUVASTATIN CALCIUM 20 MG/1
20 TABLET, COATED ORAL NIGHTLY
Qty: 90 TABLET | Refills: 0 | Status: SHIPPED | OUTPATIENT
Start: 2020-01-19 | End: 2020-05-12 | Stop reason: SDUPTHER

## 2020-01-19 RX ADMIN — ENOXAPARIN SODIUM 40 MG: 40 INJECTION SUBCUTANEOUS at 09:52

## 2020-01-19 RX ADMIN — PRASUGREL HYDROCHLORIDE 10 MG: 10 TABLET, FILM COATED ORAL at 09:51

## 2020-01-19 RX ADMIN — ASPIRIN 81 MG 81 MG: 81 TABLET ORAL at 09:52

## 2020-01-19 RX ADMIN — METOPROLOL TARTRATE 12.5 MG: 25 TABLET, FILM COATED ORAL at 09:51

## 2020-01-19 NOTE — PROGRESS NOTES
Case Management Discharge Note      Final Note: Spoke with patient and family at bedside regarding discharge plan who are interested in having a PCP established withing the [facility].  Provider list provided to patient per request.  No other discharge needs verbalized.  Called [facility] and left voicemail with patient information to set appointment to establish care with  callback number.  Patient plan is to discharge home today via car with family to transport.           Destination      No service has been selected for the patient.      Durable Medical Equipment      No service has been selected for the patient.      Dialysis/Infusion      No service has been selected for the patient.      Home Medical Care      No service has been selected for the patient.      Therapy      No service has been selected for the patient.      Community Resources      No service has been selected for the patient.             Final Discharge Disposition Code: 01 - home or self-care

## 2020-01-19 NOTE — PLAN OF CARE
Problem: Patient Care Overview  Goal: Plan of Care Review  Outcome: Ongoing (interventions implemented as appropriate)  Flowsheets  Taken 1/19/2020 0501  Progress: improving  Taken 1/18/2020 1938  Plan of Care Reviewed With: patient   Pt's vitals stable. Metoprolol held - heart rate in the 50's at the time. Pt's heart rate stayed sinus kary (40's to 50's). Pt slept majority of the night without events. Will continue to monitor.

## 2020-01-19 NOTE — DISCHARGE SUMMARY
Date of Discharge:  1/19/2020  Date of Admit: 1/17/2020    Provider, No Known      Discharge Diagnosis:  ST elevation myocardial infarction involving left circumflex coronary artery (CMS/[facility])    Hyperlipidemia LDL goal <70    Ventricular tachycardia (paroxysmal) (CMS/[facility])      Hospital Course:   Patient is a 54-year-old  male who was admitted on January 17 for an ST elevated myocardial infarction.  He underwent emergent cardiac catheterization with intervention as documented below.  He was noted within 24 hours of revascularization to have nonsustained ventricular tachycardia.  He was placed short-term on amiodarone therapy.  This was subsequently discontinued and his beta-blocker was maximized.  On January 19, 2020 it was felt that he had reached maximal benefit from his stay in the hospital and was ready for discharge home.    Procedures Performed  Procedure(s):  Left Heart Cath  Percutaneous Mechanical Thrombectomy  Stent OTONIEL coronary  ·   Severe 1-vessel CAD involving a 100% thrombotic occlusion of the circumflex. This was the culprit for the patient's inferior lateral STEMI.  · LVEF 42% with inferior and lateral hypokinesis  · Normal LV filling pressure  · Partially successful manual thrombectomy and successful mechanical thrombectomy of the circumflex  Successful PCI of the left circumflex into OM 3 using overlapping Xience drug-eluting stents.      ECHO:  · Left ventricular systolic function is normal. Estimated EF = 60%.  · The following left ventricular wall segments are hypokinetic: basal anterolateral, mid anterolateral, apical lateral, basal inferolateral and mid inferolateral.  · Left atrial cavity size is dilated.  · Mild mitral valve regurgitation is present        Consults     No orders found from 12/19/2019 to 1/18/2020.        .      Discharge Physical Exam:  /70 (BP Location: Right arm, Patient Position: Lying)   Pulse 55   Temp 97.5 °F (36.4 °C) (Oral)   Resp 16   Ht 180.3 cm  "(71\")   Wt 83 kg (183 lb)   SpO2 96%   BMI 25.52 kg/m²     Physical Exam   Constitutional: He is oriented to person, place, and time. He appears well-developed and well-nourished. No distress.   Neck: No JVD present. No tracheal deviation present.   Cardiovascular: Normal rate, regular rhythm, normal heart sounds and intact distal pulses. Exam reveals no friction rub.   No murmur heard.  Pulmonary/Chest: Effort normal and breath sounds normal. No respiratory distress.   Abdominal: Soft. Bowel sounds are normal. There is no tenderness.   Musculoskeletal: He exhibits no edema or deformity.   Neurological: He is alert and oriented to person, place, and time.   Skin: Skin is warm and dry.         Discharge Medications     Discharge Medications      New Medications      Instructions Start Date   aspirin 81 MG chewable tablet   81 mg, Oral, Daily      metoprolol tartrate 25 MG tablet  Commonly known as:  LOPRESSOR   12.5 mg, Oral, Every 12 Hours Scheduled      nitroglycerin 0.4 MG SL tablet  Commonly known as:  NITROSTAT   0.4 mg, Sublingual, Every 5 Minutes PRN, Take no more than 3 doses in 15 minutes.      prasugrel 10 MG tablet  Commonly known as:  EFFIENT   10 mg, Oral, Daily      rosuvastatin 20 MG tablet  Commonly known as:  CRESTOR   20 mg, Oral, Nightly             Discharge Diet: cardiac diet    Activity at Discharge: light activity    Discharge disposition: home    Condition on Discharge: stable    Follow-up Appointments  Future Appointments   Date Time Provider Department Center   2/7/2020  1:00 PM ORIENTATION -  [facility]     Additional Instructions for the Follow-ups that You Need to Schedule     Discharge Follow-up with Specialty: [facility]; 1 Month   As directed      Specialty:  [facility]    Follow Up:  1 Month    Follow Up Details:  Hospital FU for STEMI         Comprehensive Metabolic Panel    Feb 29, 2020 (Approximate)      Lipid Panel    Feb 29, 2020 (Approximate)      "          Meche Rivas, BEATRIZ  01/19/20  9:47 AM  I have seen and examined the patient, I have reviewed the note, discussed the case with the advance practice clinician, made necessary changes and I agree with the final note.    Pb Serra MD  01/19/20  9:55 AM        Dictated with Lawson.

## 2020-01-20 ENCOUNTER — TRANSITIONAL CARE MANAGEMENT TELEPHONE ENCOUNTER (OUTPATIENT)
Dept: INTERNAL MEDICINE | Facility: CLINIC | Age: 55
End: 2020-01-20

## 2020-01-20 ENCOUNTER — READMISSION MANAGEMENT (OUTPATIENT)
Dept: CALL CENTER | Facility: HOSPITAL | Age: 55
End: 2020-01-20

## 2020-01-20 RX ORDER — PRASUGREL 10 MG/1
10 TABLET, FILM COATED ORAL DAILY
Qty: 30 TABLET | Refills: 11 | Status: SHIPPED | OUTPATIENT
Start: 2020-01-20 | End: 2020-10-16

## 2020-01-20 NOTE — OUTREACH NOTE
Prep Survey      Responses   Facility patient discharged from?  [place]   Is patient eligible?  Yes   Discharge diagnosis  STEMI LCFX , HLD, VT, S/P LHC with [facility] thrombectomy, with OTONIEL to OM 3   Does the patient have one of the following disease processes/diagnoses(primary or secondary)?  Acute MI (STEMI,NSTEMI)   Does the patient have Home health ordered?  No   Is there a DME ordered?  No   Comments regarding appointments  Pt to schedule F/U appontments   Prep survey completed?  Yes          Luisa Gonzales RN

## 2020-01-21 ENCOUNTER — READMISSION MANAGEMENT (OUTPATIENT)
Dept: CALL CENTER | Facility: HOSPITAL | Age: 55
End: 2020-01-21

## 2020-01-21 LAB
ACT BLD: 219 SECONDS (ref 82–152)
ACT BLD: 296 SECONDS (ref 82–152)

## 2020-01-21 NOTE — OUTREACH NOTE
AMI Week 1 Survey      Responses   Facility patient discharged from?  [place]   Does the patient have one of the following disease processes/diagnoses(primary or secondary)?  Acute MI (STEMI,NSTEMI)   Is there a successful TCM telephone encounter documented?  Yes          Estephanie Sinclair RN

## 2020-01-21 NOTE — OUTREACH NOTE
"TCM call completed.  See TCM flowsheet for details.  Does have upcoming new pt/hospital follow up appt with Dr. Gm Morrison 1/24/2020.  Wife returned call to set up new PCP appt and she states he is doing OK.  Did have some lightheadedness, but he does have \"a little problem with hypoglycemia\" and she contributes the lightheadedness to that. There is no ANNAMARIE, so no further questions were asked, however she denies any needs.       "

## 2020-01-24 ENCOUNTER — OFFICE VISIT (OUTPATIENT)
Dept: FAMILY MEDICINE CLINIC | Facility: CLINIC | Age: 55
End: 2020-01-24

## 2020-01-24 VITALS
HEIGHT: 71 IN | SYSTOLIC BLOOD PRESSURE: 102 MMHG | OXYGEN SATURATION: 100 % | HEART RATE: 49 BPM | WEIGHT: 188 LBS | DIASTOLIC BLOOD PRESSURE: 58 MMHG | BODY MASS INDEX: 26.32 KG/M2

## 2020-01-24 DIAGNOSIS — M25.561 ACUTE PAIN OF RIGHT KNEE: ICD-10-CM

## 2020-01-24 DIAGNOSIS — E78.5 HYPERLIPIDEMIA LDL GOAL <70: ICD-10-CM

## 2020-01-24 DIAGNOSIS — I21.21 ST ELEVATION MYOCARDIAL INFARCTION INVOLVING LEFT CIRCUMFLEX CORONARY ARTERY (HCC): Primary | ICD-10-CM

## 2020-01-24 PROCEDURE — 99496 TRANSJ CARE MGMT HIGH F2F 7D: CPT | Performed by: FAMILY MEDICINE

## 2020-01-24 NOTE — PROGRESS NOTES
Transitional Care Follow Up Visit  Subjective     Manuel Sauceda is a 54 y.o. male who presents for a transitional care management visit.    Within 48 business hours after discharge our office contacted him via telephone to coordinate his care and needs.      I reviewed and discussed the details of that call along with the discharge summary, hospital problems, inpatient lab results, inpatient diagnostic studies, and consultation reports with Manuel.    Patient Care Team:  Gm Morrison DO as PCP - General (Urgent Care)     Current outpatient and discharge medications have been reconciled for the patient.  Reviewed by: Gm Morrison DO      Date of TCM Phone Call 1/21/2020   [facility]   Date of Admission 1/17/2020   Date of Discharge 1/19/2020   Discharge Disposition Home or Self Care     Risk for Readmission (LACE) Score: 6 (1/19/2020  6:00 AM)      History of Present Illness   Course During Hospital Stay:    Patient is a 54-year-old  male who was admitted on January 17 for an ST elevated myocardial infarction.  He underwent emergent cardiac catheterization with intervention as documented below.  He was noted within 24 hours of revascularization to have nonsustained ventricular tachycardia.  He was placed short-term on amiodarone therapy.  This was subsequently discontinued and his beta-blocker was maximized.  On January 19, 2020 it was felt that he had reached maximal benefit from his stay in the hospital and was ready for discharge home.    Since his hospitalization he has been doing well.  He still has some fatigue but he feels it is about the level you would have expected.  He tried to go back to work half time this past week but has had worsened fatigue with that.  He works as  for [employer]. His wife is here with him today and contributes to his history.     The following portions of the patient's history were reviewed and updated as appropriate:  "allergies, current medications, past family history, past medical history, past social history, past surgical history and problem list.    Review of Systems   Constitutional: Positive for fatigue.   HENT: Negative.    Eyes: Negative.    Respiratory: Negative for cough, chest tightness, shortness of breath and wheezing.    Cardiovascular: Negative for chest pain and palpitations.   Gastrointestinal: Negative for abdominal distention, abdominal pain, constipation, diarrhea, nausea and vomiting.   Musculoskeletal: Negative for gait problem and joint swelling.   Skin: Negative for color change and wound.   Psychiatric/Behavioral: Negative for agitation and hallucinations.       Objective   Blood pressure 102/58, pulse (!) 49, height 180.3 cm (70.98\"), weight 85.3 kg (188 lb), SpO2 100 %.  Nursing note reviewed  Physical Exam  Const: NAD, A&Ox4, Pleasant, Cooperative  Eyes: EOMI, no conjunctivitis  ENT: No nasal discharge present, neck supple  Cardiac: Regular rate and rhythm, no cyanosis  Resp: Respiratory rate within normal limits, no increased work of breathing, no audible wheezing or retractions noted  GI: No distention or ascites  MSK: Examination of the right knee reveals no effusion.  There is mild medial joint line tenderness, negative María, no palpable click  Neurologic: CN II-XII grossly intact  Psych: Appropriate mood and behavior.  Skin: Pink, warm, dry  Assessment/Plan     Manuel was seen today for establish care.    Diagnoses and all orders for this visit:    ST elevation myocardial infarction involving left circumflex coronary artery (CMS/[facility])    Hyperlipidemia LDL goal <70    Acute pain of right knee      #1 STEMI  He has been doing very well is on dual antiplatelet therapy with aspirin 81 mg and Prasugrel 10 mg daily.  He is also on an appropriate statin 20 mg of rosuvastatin daily.  He has not had any residual chest pain.  He is prescribed metoprolol 12.5 mg twice daily but has not been taking this.  " He was told to hold it for blood pressure systolic under 110 or pulse rate under 60, he chronically has HR in 40s-50s  -Recommended that he work from home over the next couple weeks    #2 HLD  Continue rosuvastatin 20mg daily  Counseled on need for this life-long  Discussed anti-inflammatory diet for heart disease    #3 right knee pain  Chronic secondary to injury to meniscus about 1 year ago, he reports he never had it evaluated at that time  He endorses mechanical symptoms and occasional knee swelling, mainly on the medial aspect, these have not responded to ice, anti-inflammatories, or home physical therapy exercises    There are no Patient Instructions on file for this visit.

## 2020-01-27 ENCOUNTER — READMISSION MANAGEMENT (OUTPATIENT)
Dept: CALL CENTER | Facility: HOSPITAL | Age: 55
End: 2020-01-27

## 2020-01-27 NOTE — OUTREACH NOTE
AMI Week 2 Survey      Responses   Facility patient discharged from?  [place]   Does the patient have one of the following disease processes/diagnoses(primary or secondary)?  Acute MI (STEMI,NSTEMI)   Week 2 attempt successful?  No   Unsuccessful attempts  Attempt 1          Paris Dennison RN

## 2020-01-29 ENCOUNTER — READMISSION MANAGEMENT (OUTPATIENT)
Dept: CALL CENTER | Facility: HOSPITAL | Age: 55
End: 2020-01-29

## 2020-01-29 NOTE — OUTREACH NOTE
AMI Week 2 Survey      Responses   Facility patient discharged from?  [place]   Does the patient have one of the following disease processes/diagnoses(primary or secondary)?  Acute MI (STEMI,NSTEMI)   Week 2 attempt successful?  Yes   Call start time  1559   Call end time  1603   Discharge diagnosis  STEMI, heart cath, stent, thrombectomy   Is patient permission given to speak with other caregiver?  No   Meds reviewed with patient/caregiver?  Yes   Is the patient having any side effects they believe may be caused by any medication additions or changes?  No   Does the patient have all prescriptions related to this admission filled (includes statins,anticoagulants,HTN meds,anti-arrhythmia meds)  Yes   Is the patient taking all medications as directed (includes completed medication regime)?  Yes   Does the patient have a primary care provider?   Yes   Comments regarding PCP  Patient reports that he has had f/u with PCP   Has the patient kept scheduled appointments due by today?  Yes   Has home health visited the patient within 72 hours of discharge?  N/A   Psychosocial issues?  No   Did the patient receive a copy of their discharge instructions?  Yes   Nursing interventions  Reviewed instructions with patient   What is the patient's perception of their health status since discharge?  Improving   Is the patient/caregiver able to teach back signs and symptoms of when to call for help immediately:  Sudden chest discomfort, Sudden discomfort in arms, back, neck or jaw, Shortness of breath at any time, Sudden sweating or clammy skin, Nausea or vomiting, Dizziness or lightheadedness, Irregular or rapid heart rate [Patient denies any worrisome symptoms. ]   Is the pateint /caregiver able to teach back the importance of cardiac rehab?  Yes   Nursing interventions  -- [Patient scheduled for cardiac rehab 2/7/20]   Is the patient/caregiver able to teach back ways to prevent a second heart attack:  Participate in Cardiac Rehab,  Follow up with MD, Take medications   Is the patient/caregiver able to teach back the hierarchy of who to call/visit for symptoms/problems? PCP, Specialist, Home health nurse, Urgent Care, ED, 911  Yes   Week 2 call completed?  Yes          Luisa Albarado RN

## 2020-02-07 ENCOUNTER — READMISSION MANAGEMENT (OUTPATIENT)
Dept: CALL CENTER | Facility: HOSPITAL | Age: 55
End: 2020-02-07

## 2020-02-07 ENCOUNTER — TREATMENT (OUTPATIENT)
Dept: CARDIAC REHAB | Facility: HOSPITAL | Age: 55
End: 2020-02-07

## 2020-02-07 DIAGNOSIS — I21.3 ST ELEVATION MYOCARDIAL INFARCTION (STEMI), UNSPECIFIED ARTERY (HCC): ICD-10-CM

## 2020-02-07 PROCEDURE — 93798 PHYS/QHP OP CAR RHAB W/ECG: CPT

## 2020-02-07 NOTE — PROGRESS NOTES
Cardiac Rehab Initial Assessment      Name: Manuel Sauceda  :1965 Allergies:Erythromycin   MRN: [id number] 54 y.o. Physician: Gm Morrison DO   Primary Diagnosis:    Diagnosis Plan   1. ST elevation myocardial infarction (STEMI), unspecified artery (CMS/HCC)  Cardiac Rehab Phase II    Event Date: 20 Specialist: Dr. Cox   Secondary Diagnosis: PCI Risk Stratification:High Risk Note Author: Jessie Szymanski     Cardiovascular History: Comments n/a     EXERCISE AT HOME  no      EF: 42%      Source: [facility] on 20          Ambulatory Status:Independent  Ambulatory Fall Risk Assessed on Initial Visit: yes 6 Minute Walk Pre- Cardiac Rehab:  Distance:1466ft      RPE:11  Max. HR: 80       SPO2:97    MET: 3.1  Resting BP: 112/62 LA, 116/70 RA    Peak BP: 122/70  Recovery BP: 118/70  Comments:       NUTRITION  Lipids:yes If yes, labs as follows;  Total: No components found for: CHOLESTEROL  HDL:   HDL Cholesterol   Date Value Ref Range Status   2020 45 40 - 60 mg/dL Final    Lipids continued:  LDL:  LDL Cholesterol    Date Value Ref Range Status   2020 86 0 - 100 mg/dL Final     Triglyceride: No components found for: TRIGLYCERIDE   Weight Management:                 Weight: 190.4  Height: 71                                   BMI: There is no height or weight on file to calculate BMI.  Waist Circumference:   inches   Alcohol Use: 4 beers per week(s) Diabetes:No    Last HGBA1C with date if applicable:No components found for: A1C         SOCIAL HISTORY  Social History     Socioeconomic History   • Marital status:      Spouse name: Not on file   • Number of children: Not on file   • Years of education: Not on file   • Highest education level: Not on file   Tobacco Use   • Smoking status: Former Smoker     Last attempt to quit: 2015     Years since quittin.0   • Smokeless tobacco: Never Used   Substance and Sexual Activity   • Alcohol use: Yes     Alcohol/week: 4.0  standard drinks     Types: 4 Cans of beer per week   • Drug use: No   • Sexual activity: Defer       Educational Level (choose one that applies) some college Learning Barriers:Ready to Learn    Family Support:yes    Living Arrangement: lives with their family    Risk Factors: Hyperlipidemia  Yes     Tobacco Adjunct: No  Former smoker. 2ppd for 20 years quit in 2013      Comorbidities: n/a     PSYCHOSOCIAL  Clinical Depression: no    Stress: no     Assess presence or absence of depression using a valid screening tool: yes      PHYSICAL ASSESSMENT  Influenza vaccine: no  Pneumococcal vaccine: no          Angina: no    Describe angina scale of 0 - 4: 0 = none    Today are you having incisional pain? N/A. If, Yes, Scale: 0        Today are you having any other pain? No. If, Yes, Scale: 0     Diagnosed with Hypertension:no    Heart Sounds:      Lung Sounds:          Assessment:  Orthopedic Problems: n/a    Are you being hurt, hit, or frightened by anyone at home or in your life? no    Are you being neglected by a caregiver? N/A Shoulder flexibility/Range of motion: Average     Recommended arm activity: Any    Chair sit and reach within:    Leg flexibility: Average    Leg Strength/Balance/Five times sit to stand:      Chose one: Average    Recommended stretching: Standing    Assessment:     Family attends IA: no Time of arrival: 1300  Time of departure: 1400     Patient Goals: Run by doron.          2/7/2020  2:22 PM  Jessie Szymanski

## 2020-02-07 NOTE — OUTREACH NOTE
AMI Week 3 Survey      Responses   Facility patient discharged from?  [place]   Does the patient have one of the following disease processes/diagnoses(primary or secondary)?  Acute MI (STEMI,NSTEMI)   Week 3 attempt successful?  No   Unsuccessful attempts  Attempt 1          Faith Gonzalez RN

## 2020-02-10 ENCOUNTER — TREATMENT (OUTPATIENT)
Dept: CARDIAC REHAB | Facility: HOSPITAL | Age: 55
End: 2020-02-10

## 2020-02-10 ENCOUNTER — READMISSION MANAGEMENT (OUTPATIENT)
Dept: CALL CENTER | Facility: HOSPITAL | Age: 55
End: 2020-02-10

## 2020-02-10 DIAGNOSIS — I21.3 ST ELEVATION MYOCARDIAL INFARCTION (STEMI), UNSPECIFIED ARTERY (HCC): Primary | ICD-10-CM

## 2020-02-10 PROCEDURE — 93798 PHYS/QHP OP CAR RHAB W/ECG: CPT

## 2020-02-10 NOTE — OUTREACH NOTE
AMI Week 3 Survey      Responses   Facility patient discharged from?  [place]   Does the patient have one of the following disease processes/diagnoses(primary or secondary)?  Acute MI (STEMI,NSTEMI)   Week 3 attempt successful?  No   Unsuccessful attempts  Attempt 2          Adela Woodard RN

## 2020-02-12 ENCOUNTER — TREATMENT (OUTPATIENT)
Dept: CARDIAC REHAB | Facility: HOSPITAL | Age: 55
End: 2020-02-12

## 2020-02-12 DIAGNOSIS — I21.3 ST ELEVATION MYOCARDIAL INFARCTION (STEMI), UNSPECIFIED ARTERY (HCC): Primary | ICD-10-CM

## 2020-02-12 PROCEDURE — 93798 PHYS/QHP OP CAR RHAB W/ECG: CPT

## 2020-02-14 ENCOUNTER — TREATMENT (OUTPATIENT)
Dept: CARDIAC REHAB | Facility: HOSPITAL | Age: 55
End: 2020-02-14

## 2020-02-14 DIAGNOSIS — I21.3 ST ELEVATION MYOCARDIAL INFARCTION (STEMI), UNSPECIFIED ARTERY (HCC): Primary | ICD-10-CM

## 2020-02-14 PROCEDURE — 93798 PHYS/QHP OP CAR RHAB W/ECG: CPT

## 2020-02-17 ENCOUNTER — TREATMENT (OUTPATIENT)
Dept: CARDIAC REHAB | Facility: HOSPITAL | Age: 55
End: 2020-02-17

## 2020-02-17 DIAGNOSIS — I21.3 ST ELEVATION MYOCARDIAL INFARCTION (STEMI), UNSPECIFIED ARTERY (HCC): Primary | ICD-10-CM

## 2020-02-17 PROCEDURE — 93798 PHYS/QHP OP CAR RHAB W/ECG: CPT

## 2020-02-19 ENCOUNTER — TREATMENT (OUTPATIENT)
Dept: CARDIAC REHAB | Facility: HOSPITAL | Age: 55
End: 2020-02-19

## 2020-02-19 DIAGNOSIS — I21.3 ST ELEVATION MYOCARDIAL INFARCTION (STEMI), UNSPECIFIED ARTERY (HCC): Primary | ICD-10-CM

## 2020-02-19 PROCEDURE — 93798 PHYS/QHP OP CAR RHAB W/ECG: CPT

## 2020-02-21 ENCOUNTER — TREATMENT (OUTPATIENT)
Dept: CARDIAC REHAB | Facility: HOSPITAL | Age: 55
End: 2020-02-21

## 2020-02-21 DIAGNOSIS — I21.3 ST ELEVATION MYOCARDIAL INFARCTION (STEMI), UNSPECIFIED ARTERY (HCC): Primary | ICD-10-CM

## 2020-02-21 PROCEDURE — 93798 PHYS/QHP OP CAR RHAB W/ECG: CPT

## 2020-02-24 ENCOUNTER — TREATMENT (OUTPATIENT)
Dept: CARDIAC REHAB | Facility: HOSPITAL | Age: 55
End: 2020-02-24

## 2020-02-24 DIAGNOSIS — I21.3 ST ELEVATION MYOCARDIAL INFARCTION (STEMI), UNSPECIFIED ARTERY (HCC): Primary | ICD-10-CM

## 2020-02-24 PROCEDURE — 93798 PHYS/QHP OP CAR RHAB W/ECG: CPT

## 2020-02-28 PROBLEM — I25.10 CORONARY ARTERY DISEASE INVOLVING NATIVE CORONARY ARTERY OF NATIVE HEART: Status: ACTIVE | Noted: 2020-01-17

## 2020-03-02 ENCOUNTER — TREATMENT (OUTPATIENT)
Dept: CARDIAC REHAB | Facility: HOSPITAL | Age: 55
End: 2020-03-02

## 2020-03-02 DIAGNOSIS — I21.3 ST ELEVATION MYOCARDIAL INFARCTION (STEMI), UNSPECIFIED ARTERY (HCC): Primary | ICD-10-CM

## 2020-03-02 PROCEDURE — 93798 PHYS/QHP OP CAR RHAB W/ECG: CPT

## 2020-03-02 NOTE — PROGRESS NOTES
Attended Phase II Cardiac Rehab. No medication or health history changes reported. See [facility] for details.

## 2020-03-03 ENCOUNTER — OFFICE VISIT (OUTPATIENT)
Dept: CARDIOLOGY | Facility: CLINIC | Age: 55
End: 2020-03-03

## 2020-03-03 ENCOUNTER — LAB (OUTPATIENT)
Dept: LAB | Facility: HOSPITAL | Age: 55
End: 2020-03-03

## 2020-03-03 VITALS
WEIGHT: 191.6 LBS | DIASTOLIC BLOOD PRESSURE: 68 MMHG | SYSTOLIC BLOOD PRESSURE: 108 MMHG | BODY MASS INDEX: 26.82 KG/M2 | HEART RATE: 50 BPM | OXYGEN SATURATION: 98 % | HEIGHT: 71 IN

## 2020-03-03 DIAGNOSIS — I47.29 VENTRICULAR TACHYCARDIA (PAROXYSMAL) (HCC): ICD-10-CM

## 2020-03-03 DIAGNOSIS — I25.10 CORONARY ARTERY DISEASE INVOLVING NATIVE CORONARY ARTERY OF NATIVE HEART WITHOUT ANGINA PECTORIS: Primary | ICD-10-CM

## 2020-03-03 DIAGNOSIS — E78.5 HYPERLIPIDEMIA LDL GOAL <70: ICD-10-CM

## 2020-03-03 DIAGNOSIS — R00.1 BRADYCARDIA, SINUS: ICD-10-CM

## 2020-03-03 LAB
ALBUMIN SERPL-MCNC: 4.5 G/DL (ref 3.5–5.2)
ALBUMIN/GLOB SERPL: 2 G/DL
ALP SERPL-CCNC: 51 U/L (ref 39–117)
ALT SERPL W P-5'-P-CCNC: 25 U/L (ref 1–41)
ANION GAP SERPL CALCULATED.3IONS-SCNC: 13.8 MMOL/L (ref 5–15)
AST SERPL-CCNC: 28 U/L (ref 1–40)
BILIRUB SERPL-MCNC: 0.4 MG/DL (ref 0.2–1.2)
BUN BLD-MCNC: 19 MG/DL (ref 6–20)
BUN/CREAT SERPL: 18.3 (ref 7–25)
CALCIUM SPEC-SCNC: 9.3 MG/DL (ref 8.6–10.5)
CHLORIDE SERPL-SCNC: 99 MMOL/L (ref 98–107)
CHOLEST SERPL-MCNC: 114 MG/DL (ref 0–200)
CO2 SERPL-SCNC: 24.2 MMOL/L (ref 22–29)
CREAT BLD-MCNC: 1.04 MG/DL (ref 0.76–1.27)
GFR SERPL CREATININE-BSD FRML MDRD: 75 ML/MIN/1.73
GLOBULIN UR ELPH-MCNC: 2.3 GM/DL
GLUCOSE BLD-MCNC: 82 MG/DL (ref 65–99)
HDLC SERPL-MCNC: 49 MG/DL (ref 40–60)
LDLC SERPL CALC-MCNC: 45 MG/DL (ref 0–100)
LDLC/HDLC SERPL: 0.93 {RATIO}
POTASSIUM BLD-SCNC: 3.8 MMOL/L (ref 3.5–5.2)
PROT SERPL-MCNC: 6.8 G/DL (ref 6–8.5)
SODIUM BLD-SCNC: 137 MMOL/L (ref 136–145)
TRIGL SERPL-MCNC: 98 MG/DL (ref 0–150)
VLDLC SERPL-MCNC: 19.6 MG/DL (ref 5–40)

## 2020-03-03 PROCEDURE — 80053 COMPREHEN METABOLIC PANEL: CPT

## 2020-03-03 PROCEDURE — 99214 OFFICE O/P EST MOD 30 MIN: CPT | Performed by: NURSE PRACTITIONER

## 2020-03-03 PROCEDURE — 36415 COLL VENOUS BLD VENIPUNCTURE: CPT

## 2020-03-03 PROCEDURE — 80061 LIPID PANEL: CPT

## 2020-03-03 NOTE — ASSESSMENT & PLAN NOTE
· No signs or symptoms of angina  · Continue dual antiplatelet therapy with Effient 10 mg daily and aspirin 81 mg daily  · Defer BB therapy d/t bradycardia

## 2020-03-04 ENCOUNTER — TREATMENT (OUTPATIENT)
Dept: CARDIAC REHAB | Facility: HOSPITAL | Age: 55
End: 2020-03-04

## 2020-03-04 DIAGNOSIS — I21.3 ST ELEVATION MYOCARDIAL INFARCTION (STEMI), UNSPECIFIED ARTERY (HCC): Primary | ICD-10-CM

## 2020-03-04 PROCEDURE — 93798 PHYS/QHP OP CAR RHAB W/ECG: CPT

## 2020-03-04 NOTE — PROGRESS NOTES
Attended Phase II Cardiac Rehab. No medication or health history changes reported. See [facility] for details.

## 2020-03-06 ENCOUNTER — DOCUMENTATION (OUTPATIENT)
Dept: CARDIAC REHAB | Facility: HOSPITAL | Age: 55
End: 2020-03-06

## 2020-03-06 NOTE — PROGRESS NOTES
Patient called to cancel Cardiac Rehab Phase II appointment for today.  Patient stated was unable to leave work today.

## 2020-03-19 ENCOUNTER — TELEPHONE (OUTPATIENT)
Dept: CARDIOLOGY | Facility: CLINIC | Age: 55
End: 2020-03-19

## 2020-03-19 NOTE — TELEPHONE ENCOUNTER
Please contact patient and let him know that due to the coronavirus issues we will have him come back to see us in 8 to 10 months.  Please tell him to contact us via my chart if he is experiencing any issues or needs refills.    YOSI Cox MD FACC, [facility]  Interventional and General Cardiology

## 2020-03-25 ENCOUNTER — DOCUMENTATION (OUTPATIENT)
Dept: CARDIAC REHAB | Facility: HOSPITAL | Age: 55
End: 2020-03-25

## 2020-03-25 NOTE — PROGRESS NOTES
Cardiac Rehab closed related to COVID-19 on 3/18/20. Patient notified per phone that the unit will be closed. Staff will contact Patient when Cardiac Rehab re-opens.

## 2020-04-06 ENCOUNTER — TELEPHONE (OUTPATIENT)
Dept: CARDIAC REHAB | Facility: HOSPITAL | Age: 55
End: 2020-04-06

## 2020-04-06 NOTE — TELEPHONE ENCOUNTER
Cardiac Rehab closed to COVID-19. Staff left message with Patient in regards to questions he might have during this time. Staff to send educational exercise resources to Patient. Patient to call staff if he has any questions regarding resources. Staff will contact Patient when Cardiac Rehab re-opens.

## 2020-05-12 DIAGNOSIS — E78.5 HYPERLIPIDEMIA LDL GOAL <70: ICD-10-CM

## 2020-05-12 DIAGNOSIS — I21.21 ST ELEVATION MYOCARDIAL INFARCTION INVOLVING LEFT CIRCUMFLEX CORONARY ARTERY (HCC): ICD-10-CM

## 2020-05-13 RX ORDER — ROSUVASTATIN CALCIUM 20 MG/1
20 TABLET, COATED ORAL NIGHTLY
Qty: 90 TABLET | Refills: 0 | Status: SHIPPED | OUTPATIENT
Start: 2020-05-13 | End: 2020-08-10

## 2020-05-13 RX ORDER — NITROGLYCERIN 0.4 MG/1
0.4 TABLET SUBLINGUAL
Qty: 25 TABLET | Refills: 0 | Status: SHIPPED | OUTPATIENT
Start: 2020-05-13 | End: 2020-12-08 | Stop reason: SDUPTHER

## 2020-06-16 ENCOUNTER — DOCUMENTATION (OUTPATIENT)
Dept: CARDIAC REHAB | Facility: HOSPITAL | Age: 55
End: 2020-06-16

## 2020-06-16 NOTE — PROGRESS NOTES
Staff attempted to contact patient in regards to returning to Phase II Cardiac Rehab. When Patient returns call staff will discuss COVID-19 guidelines for Cardiac Rehab.

## 2020-06-25 ENCOUNTER — DOCUMENTATION (OUTPATIENT)
Dept: CARDIAC REHAB | Facility: HOSPITAL | Age: 55
End: 2020-06-25

## 2020-06-25 NOTE — PROGRESS NOTES
Staff contacted Patient in regards to returning to Phase II Cardiac Rehab. Staff left message with Patient to return phone call. If Patient does not call back by Monday, June 29th, staff will discharge Patient from the program.

## 2020-07-13 ENCOUNTER — OFFICE VISIT (OUTPATIENT)
Dept: FAMILY MEDICINE CLINIC | Facility: CLINIC | Age: 55
End: 2020-07-13

## 2020-07-13 VITALS
SYSTOLIC BLOOD PRESSURE: 118 MMHG | HEIGHT: 71 IN | BODY MASS INDEX: 27.8 KG/M2 | WEIGHT: 198.6 LBS | DIASTOLIC BLOOD PRESSURE: 68 MMHG | OXYGEN SATURATION: 98 % | HEART RATE: 52 BPM

## 2020-07-13 DIAGNOSIS — J35.9 LESION OF TONSIL: ICD-10-CM

## 2020-07-13 DIAGNOSIS — Z00.00 PREVENTATIVE HEALTH CARE: Primary | ICD-10-CM

## 2020-07-13 DIAGNOSIS — S46.001A INJURY OF RIGHT ROTATOR CUFF, INITIAL ENCOUNTER: ICD-10-CM

## 2020-07-13 DIAGNOSIS — I25.10 CORONARY ARTERY DISEASE INVOLVING NATIVE CORONARY ARTERY OF NATIVE HEART WITHOUT ANGINA PECTORIS: ICD-10-CM

## 2020-07-13 DIAGNOSIS — Z12.11 COLON CANCER SCREENING: ICD-10-CM

## 2020-07-13 DIAGNOSIS — E78.5 HYPERLIPIDEMIA LDL GOAL <70: ICD-10-CM

## 2020-07-13 PROCEDURE — 99396 PREV VISIT EST AGE 40-64: CPT | Performed by: FAMILY MEDICINE

## 2020-07-13 NOTE — PATIENT INSTRUCTIONS
1.  Follow up with your dentist for oral cancer screening    2.  See attached exercises    3.  You will be called to schedule colonoscopy    Shoulder Exercises  Ask your health care provider which exercises are safe for you. Do exercises exactly as told by your health care provider and adjust them as directed. It is normal to feel mild stretching, pulling, tightness, or discomfort as you do these exercises. Stop right away if you feel sudden pain or your pain gets worse. Do not begin these exercises until told by your health care provider.  Stretching exercises  External rotation and abduction    This exercise is sometimes called corner stretch. This exercise rotates your arm outward (external rotation) and moves your arm out from your body (abduction).  1.  a doorway with one of your feet slightly in front of the other. This is called a staggered stance. If you cannot reach your forearms to the door frame, stand facing a corner of a room.  2. Choose one of the following positions as told by your health care provider:  ? Place your hands and forearms on the door frame above your head.  ? Place your hands and forearms on the door frame at the height of your head.  ? Place your hands on the door frame at the height of your elbows.  3. Slowly move your weight onto your front foot until you feel a stretch across your chest and in the front of your shoulders. Keep your head and chest upright and keep your abdominal muscles tight.  4. Hold for ____5______ seconds.  5. To release the stretch, shift your weight to your back foot.  Repeat _____3_____ times. Complete this exercise _____3_____ times a day.  Extension, standing  1. Stand and hold a broomstick, a cane, or a similar object behind your back.  ? Your hands should be a little wider than shoulder width apart.  ? Your palms should face away from your back.  2. Keeping your elbows straight and your shoulder muscles relaxed, move the stick away from your body  until you feel a stretch in your shoulders (extension).  ? Avoid shrugging your shoulders while you move the stick. Keep your shoulder blades tucked down toward the middle of your back.  3. Hold for ____5______ seconds.  4. Slowly return to the starting position.  Repeat ______3____ times. Complete this exercise ____3______ times a day.  Range-of-motion exercises  Pendulum      1. Stand near a wall or a surface that you can hold onto for balance.  2. Bend at the waist and let your left / right arm hang straight down. Use your other arm to support you. Keep your back straight and do not lock your knees.  3. Relax your left / right arm and shoulder muscles, and move your hips and your trunk so your left / right arm swings freely. Your arm should swing because of the motion of your body, not because you are using your arm or shoulder muscles.  4. Keep moving your hips and trunk so your arm swings in the following directions, as told by your health care provider:  ? Side to side.  ? Forward and backward.  ? In clockwise and counterclockwise circles.  5. Continue each motion for __________ seconds, or for as long as told by your health care provider.  6. Slowly return to the starting position.  Repeat ____3______ times. Complete this exercise ____3______ times a day.  Shoulder flexion, standing      1. Stand and hold a broomstick, a cane, or a similar object. Place your hands a little more than shoulder width apart on the object. Your left / right hand should be palm up, and your other hand should be palm down.  2. Keep your elbow straight and your shoulder muscles relaxed. Push the stick up with your healthy arm to raise your left / right arm in front of your body, and then over your head until you feel a stretch in your shoulder (flexion).  ? Avoid shrugging your shoulder while you raise your arm. Keep your shoulder blade tucked down toward the middle of your back.  3. Hold for _____5_____ seconds.  4. Slowly return to  the starting position.  Repeat __________ times. Complete this exercise __________ times a day.  Shoulder abduction, standing  1. Stand and hold a broomstick, a cane, or a similar object. Place your hands a little more than shoulder width apart on the object. Your left / right hand should be palm up, and your other hand should be palm down.  2. Keep your elbow straight and your shoulder muscles relaxed. Push the object across your body toward your left / right side. Raise your left / right arm to the side of your body (abduction) until you feel a stretch in your shoulder.  ? Do not raise your arm above shoulder height unless your health care provider tells you to do that.  ? If directed, raise your arm over your head.  ? Avoid shrugging your shoulder while you raise your arm. Keep your shoulder blade tucked down toward the middle of your back.  3. Hold for _____5_____ seconds.  4. Slowly return to the starting position.  Repeat ____3______ times. Complete this exercise ____3______ times a day.  Internal rotation      1. Place your left / right hand behind your back, palm up.  2. Use your other hand to dangle an exercise band, a towel, or a similar object over your shoulder. Grasp the band with your left / right hand so you are holding on to both ends.  3. Gently pull up on the band until you feel a stretch in the front of your left / right shoulder. The movement of your arm toward the center of your body is called internal rotation.  ? Avoid shrugging your shoulder while you raise your arm. Keep your shoulder blade tucked down toward the middle of your back.  4. Hold for _____5_____ seconds.  5. Release the stretch by letting go of the band and lowering your hands.  Repeat ______3____ times. Complete this exercise _____3_____ times a day.  Strengthening exercises  External rotation      1. Sit in a stable chair without armrests.  2. Secure an exercise band to a stable object at elbow height on your left / right  side.  3. Place a soft object, such as a folded towel or a small pillow, between your left / right upper arm and your body to move your elbow about 4 inches (10 cm) away from your side.  4. Hold the end of the exercise band so it is tight and there is no slack.  5. Keeping your elbow pressed against the soft object, slowly move your forearm out, away from your abdomen (external rotation). Keep your body steady so only your forearm moves.  6. Hold for ____5______ seconds.  7. Slowly return to the starting position.  Repeat ____3______ times. Complete this exercise _____3_____ times a day.  Shoulder abduction      1. Sit in a stable chair without armrests, or stand up.  2. Hold a ____1lb______ weight in your left / right hand, or hold an exercise band with both hands.  3. Start with your arms straight down and your left / right palm facing in, toward your body.  4. Slowly lift your left / right hand out to your side (abduction). Do not lift your hand above shoulder height unless your health care provider tells you that this is safe.  ? Keep your arms straight.  ? Avoid shrugging your shoulder while you do this movement. Keep your shoulder blade tucked down toward the middle of your back.  5. Hold for _____5_____ seconds.  6. Slowly lower your arm, and return to the starting position.  Repeat _____3_____ times. Complete this exercise _____3_____ times a day.  Shoulder extension  1. Sit in a stable chair without armrests, or stand up.  2. Secure an exercise band to a stable object in front of you so it is at shoulder height.  3. Hold one end of the exercise band in each hand. Your palms should face each other.  4. Straighten your elbows and lift your hands up to shoulder height.  5. Step back, away from the secured end of the exercise band, until the band is tight and there is no slack.  6. Squeeze your shoulder blades together as you pull your hands down to the sides of your thighs (extension). Stop when your hands are  straight down by your sides. Do not let your hands go behind your body.  7. Hold for ____5______ seconds.  8. Slowly return to the starting position.  Repeat _____3_____ times. Complete this exercise _____3_____ times a day.  Shoulder row  1. Sit in a stable chair without armrests, or stand up.  2. Secure an exercise band to a stable object in front of you so it is at waist height.  3. Hold one end of the exercise band in each hand. Position your palms so that your thumbs are facing the ceiling (neutral position).  4. Bend each of your elbows to a 90-degree angle (right angle) and keep your upper arms at your sides.  5. Step back until the band is tight and there is no slack.  6. Slowly pull your elbows back behind you.  7. Hold for _____5_____ seconds.  8. Slowly return to the starting position.  Repeat ______3____ times. Complete this exercise ____3______ times a day.  Shoulder press-ups      1. Sit in a stable chair that has armrests. Sit upright, with your feet flat on the floor.  2. Put your hands on the armrests so your elbows are bent and your fingers are pointing forward. Your hands should be about even with the sides of your body.  3. Push down on the armrests and use your arms to lift yourself off the chair. Straighten your elbows and lift yourself up as much as you comfortably can.  ? Move your shoulder blades down, and avoid letting your shoulders move up toward your ears.  ? Keep your feet on the ground. As you get stronger, your feet should support less of your body weight as you lift yourself up.  4. Hold for ______5____ seconds.  5. Slowly lower yourself back into the chair.  Repeat _____3_____ times. Complete this exercise _____3_____ times a day.  Wall push-ups      1. Stand so you are facing a stable wall. Your feet should be about one arm-length away from the wall.  2. Lean forward and place your palms on the wall at shoulder height.  3. Keep your feet flat on the floor as you bend your elbows  and lean forward toward the wall.  4. Hold for _____5_____ seconds.  5. Straighten your elbows to push yourself back to the starting position.  Repeat ____3______ times. Complete this exercise ____3______ times a day.  This information is not intended to replace advice given to you by your health care provider. Make sure you discuss any questions you have with your health care provider.  Document Released: 11/01/2006 Document Revised: 04/10/2020 Document Reviewed: 01/17/2020  [employer] Education © 2020 [employer]

## 2020-07-13 NOTE — PROGRESS NOTES
Subjective   Manuel Sauceda is a 54 y.o. male.     Chief Complaint   Patient presents with   • Annual Exam       History of Present Illness     Manuel Sauceda presents today for annual physical exam and preventative care.    This patient is accompanied by their self who contributes to the history of their care.    The following portions of the patient's history were reviewed and updated as appropriate: allergies, current medications, past family history, past medical history, past social history, past surgical history and problem list.    Active Ambulatory Problems     Diagnosis Date Noted   • Coronary artery disease involving native coronary artery of native heart 01/17/2020   • Hyperlipidemia LDL goal <70 01/17/2020   • Ventricular tachycardia (paroxysmal) (CMS/[facility]) 01/17/2020   • Bradycardia, sinus 03/03/2020     Resolved Ambulatory Problems     Diagnosis Date Noted   • STEMI involving left circumflex coronary artery (CMS/[facility]) 01/17/2020     Past Medical History:   Diagnosis Date   • Myocardial infarction (CMS/[facility])      Past Surgical History:   Procedure Laterality Date   • CARDIAC CATHETERIZATION     • CARDIAC CATHETERIZATION N/A 1/17/2020    Procedure: Left Heart Cath;  Surgeon: Shakeel Cox IV, MD;  Location:  [facility];  Service: Cardiology   • CARDIAC CATHETERIZATION  1/17/2020    Procedure: Percutaneous Mechanical Thrombectomy;  Surgeon: Shakeel Cox IV, MD;  Location:  [facility];  Service: Cardiology   • CARDIAC CATHETERIZATION N/A 1/17/2020    Procedure: Stent OTONIEL coronary;  Surgeon: Shakeel Cox IV, MD;  Location:  [facility];  Service: Cardiology   • ROTATOR CUFF REPAIR Left      Family History   Problem Relation Age of Onset   • Heart attack Father 60   • Hyperlipidemia Father    • Heart attack Brother 45   • Thyroid disease Sister    • Thyroid disease Daughter      Social History  "    Socioeconomic History   • Marital status:      Spouse name: Not on file   • Number of children: Not on file   • Years of education: Not on file   • Highest education level: Not on file   Tobacco Use   • Smoking status: Former Smoker     Last attempt to quit: 2015     Years since quittin.4   • Smokeless tobacco: Never Used   Substance and Sexual Activity   • Alcohol use: Yes     Alcohol/week: 4.0 standard drinks     Types: 4 Cans of beer per week   • Drug use: No   • Sexual activity: Defer       Review of Systems  Review of Systems -  General ROS: negative for - chills, fever or night sweats  Cardiovascular ROS: no chest pain or dyspnea on exertion  Gastrointestinal ROS: no abdominal pain, change in bowel habits, or black or bloody stools  Genito-Urinary ROS: no dysuria, trouble voiding, or hematuria    Objective   Blood pressure 118/68, pulse 52, height 180.3 cm (71\"), weight 90.1 kg (198 lb 9.6 oz), SpO2 98 %.  Nursing note reviewed  Physical Exam  General: Patient is well-nourished, well-developed, and in no acute distress.  HEENT: Normocephalic with no contusions noted, no ptosis or palsy. Pupils equally round and reactive to light, extraocular movements intact. Patient holds steady gaze, can follow to midline. Hearing grossly normal without deficit, exterior auricles normal, tympanic membranes normal without erythema or bulging.  Lymphatic: Posterior auricular, cervical, submandibular, supraclavicular, axillary lymphatic sites palpated without abnormality  Cardiovascular: Normal study rate without ectopy. PMI palpated, normal. Normal S1, S2. No murmurs rubs or gallops.  Respiratory: No tenderness to palpation on the chest wall, lungs clear to auscultation bilaterally, no wheezes, rales, or rhonchi. No pleural friction rubs.  Gastrointestinal: Bowel sounds present, normoactive globally. No bruit noted. Nontender, nondistended. Normal percussive exam, no hepatomegaly, no splenomegaly. No " hernias, scars, gross lesions.  Extremities: No cyanosis or edema, 2+ pulses bilaterally, reflexes normal. Capillary refill time normal.  MSK: Normal gait and station. 5/5 strength globally.  Neuro: Cranial nerves II-XII grossly intact. Patient alert and oriented x3.  PHQ-9 Depression Screening  Little interest or pleasure in doing things?     Feeling down, depressed, or hopeless?     Trouble falling or staying asleep, or sleeping too much?     Feeling tired or having little energy?     Poor appetite or overeating?     Feeling bad about yourself - or that you are a failure or have let yourself or your family down?     Trouble concentrating on things, such as reading the newspaper or watching television?     Moving or speaking so slowly that other people could have noticed? Or the opposite - being so fidgety or restless that you have been moving around a lot more than usual?     Thoughts that you would be better off dead, or of hurting yourself in some way?     PHQ-9 Total Score     If you checked off any problems, how difficult have these problems made it for you to do your work, take care of things at home, or get along with other people?       Procedures  Assessment/Plan   Problem List Items Addressed This Visit        Cardiovascular and Mediastinum    Coronary artery disease involving native coronary artery of native heart    Overview     · Cardiac catheterization for inferior lateral STEMI (1/17/2020): 100% thrombotic occlusion of the mid circumflex status post overlapping Xience OTONIEL.  Mild disease of LAD/RCA.  LVEF 42%         Hyperlipidemia LDL goal <70    Overview     · High intensity statin therapy indicated given the presence of CAD           Other Visit Diagnoses     Preventative health care    -  Primary    Lesion of tonsil        Right palatine tonsil, mild white lesion no asymmetric swelling    Colon cancer screening        Relevant Orders    Ambulatory Referral For Screening Colonoscopy    Injury of  right rotator cuff, initial encounter              See patient diagnoses and orders along with patient instructions for assessment, plan, and changes to care for patient.    The preventative exam has been reviewed in detail.  The patient has been fully counseled on preventative guidelines for vaccines, cancer screenings, and other health maintenance needs. The patient was counseled on maintaining a lifestyle to promote good health and to minimize chronic diseases.  The patient has been assisted with scheduling healthcare procedures for the coming year and given a written document outlining these recommendations. Age-appropriate screening measures have been ordered for the patient today as indicated above.    Patient Instructions   1.  Follow up with your dentist for oral cancer screening    2.  See attached exercises    3.  You will be called to schedule colonoscopy    Shoulder Exercises  Ask your health care provider which exercises are safe for you. Do exercises exactly as told by your health care provider and adjust them as directed. It is normal to feel mild stretching, pulling, tightness, or discomfort as you do these exercises. Stop right away if you feel sudden pain or your pain gets worse. Do not begin these exercises until told by your health care provider.  Stretching exercises  External rotation and abduction    This exercise is sometimes called corner stretch. This exercise rotates your arm outward (external rotation) and moves your arm out from your body (abduction).  1.  a doorway with one of your feet slightly in front of the other. This is called a staggered stance. If you cannot reach your forearms to the door frame, stand facing a corner of a room.  2. Choose one of the following positions as told by your health care provider:  ? Place your hands and forearms on the door frame above your head.  ? Place your hands and forearms on the door frame at the height of your head.  ? Place your hands  on the door frame at the height of your elbows.  3. Slowly move your weight onto your front foot until you feel a stretch across your chest and in the front of your shoulders. Keep your head and chest upright and keep your abdominal muscles tight.  4. Hold for ____5______ seconds.  5. To release the stretch, shift your weight to your back foot.  Repeat _____3_____ times. Complete this exercise _____3_____ times a day.  Extension, standing  1. Stand and hold a broomstick, a cane, or a similar object behind your back.  ? Your hands should be a little wider than shoulder width apart.  ? Your palms should face away from your back.  2. Keeping your elbows straight and your shoulder muscles relaxed, move the stick away from your body until you feel a stretch in your shoulders (extension).  ? Avoid shrugging your shoulders while you move the stick. Keep your shoulder blades tucked down toward the middle of your back.  3. Hold for ____5______ seconds.  4. Slowly return to the starting position.  Repeat ______3____ times. Complete this exercise ____3______ times a day.  Range-of-motion exercises  Pendulum      1. Stand near a wall or a surface that you can hold onto for balance.  2. Bend at the waist and let your left / right arm hang straight down. Use your other arm to support you. Keep your back straight and do not lock your knees.  3. Relax your left / right arm and shoulder muscles, and move your hips and your trunk so your left / right arm swings freely. Your arm should swing because of the motion of your body, not because you are using your arm or shoulder muscles.  4. Keep moving your hips and trunk so your arm swings in the following directions, as told by your health care provider:  ? Side to side.  ? Forward and backward.  ? In clockwise and counterclockwise circles.  5. Continue each motion for __________ seconds, or for as long as told by your health care provider.  6. Slowly return to the starting  position.  Repeat ____3______ times. Complete this exercise ____3______ times a day.  Shoulder flexion, standing      1. Stand and hold a broomstick, a cane, or a similar object. Place your hands a little more than shoulder width apart on the object. Your left / right hand should be palm up, and your other hand should be palm down.  2. Keep your elbow straight and your shoulder muscles relaxed. Push the stick up with your healthy arm to raise your left / right arm in front of your body, and then over your head until you feel a stretch in your shoulder (flexion).  ? Avoid shrugging your shoulder while you raise your arm. Keep your shoulder blade tucked down toward the middle of your back.  3. Hold for _____5_____ seconds.  4. Slowly return to the starting position.  Repeat __________ times. Complete this exercise __________ times a day.  Shoulder abduction, standing  1. Stand and hold a broomstick, a cane, or a similar object. Place your hands a little more than shoulder width apart on the object. Your left / right hand should be palm up, and your other hand should be palm down.  2. Keep your elbow straight and your shoulder muscles relaxed. Push the object across your body toward your left / right side. Raise your left / right arm to the side of your body (abduction) until you feel a stretch in your shoulder.  ? Do not raise your arm above shoulder height unless your health care provider tells you to do that.  ? If directed, raise your arm over your head.  ? Avoid shrugging your shoulder while you raise your arm. Keep your shoulder blade tucked down toward the middle of your back.  3. Hold for _____5_____ seconds.  4. Slowly return to the starting position.  Repeat ____3______ times. Complete this exercise ____3______ times a day.  Internal rotation      1. Place your left / right hand behind your back, palm up.  2. Use your other hand to dangle an exercise band, a towel, or a similar object over your shoulder.  Grasp the band with your left / right hand so you are holding on to both ends.  3. Gently pull up on the band until you feel a stretch in the front of your left / right shoulder. The movement of your arm toward the center of your body is called internal rotation.  ? Avoid shrugging your shoulder while you raise your arm. Keep your shoulder blade tucked down toward the middle of your back.  4. Hold for _____5_____ seconds.  5. Release the stretch by letting go of the band and lowering your hands.  Repeat ______3____ times. Complete this exercise _____3_____ times a day.  Strengthening exercises  External rotation      1. Sit in a stable chair without armrests.  2. Secure an exercise band to a stable object at elbow height on your left / right side.  3. Place a soft object, such as a folded towel or a small pillow, between your left / right upper arm and your body to move your elbow about 4 inches (10 cm) away from your side.  4. Hold the end of the exercise band so it is tight and there is no slack.  5. Keeping your elbow pressed against the soft object, slowly move your forearm out, away from your abdomen (external rotation). Keep your body steady so only your forearm moves.  6. Hold for ____5______ seconds.  7. Slowly return to the starting position.  Repeat ____3______ times. Complete this exercise _____3_____ times a day.  Shoulder abduction      1. Sit in a stable chair without armrests, or stand up.  2. Hold a ____1lb______ weight in your left / right hand, or hold an exercise band with both hands.  3. Start with your arms straight down and your left / right palm facing in, toward your body.  4. Slowly lift your left / right hand out to your side (abduction). Do not lift your hand above shoulder height unless your health care provider tells you that this is safe.  ? Keep your arms straight.  ? Avoid shrugging your shoulder while you do this movement. Keep your shoulder blade tucked down toward the middle of  your back.  5. Hold for _____5_____ seconds.  6. Slowly lower your arm, and return to the starting position.  Repeat _____3_____ times. Complete this exercise _____3_____ times a day.  Shoulder extension  1. Sit in a stable chair without armrests, or stand up.  2. Secure an exercise band to a stable object in front of you so it is at shoulder height.  3. Hold one end of the exercise band in each hand. Your palms should face each other.  4. Straighten your elbows and lift your hands up to shoulder height.  5. Step back, away from the secured end of the exercise band, until the band is tight and there is no slack.  6. Squeeze your shoulder blades together as you pull your hands down to the sides of your thighs (extension). Stop when your hands are straight down by your sides. Do not let your hands go behind your body.  7. Hold for ____5______ seconds.  8. Slowly return to the starting position.  Repeat _____3_____ times. Complete this exercise _____3_____ times a day.  Shoulder row  1. Sit in a stable chair without armrests, or stand up.  2. Secure an exercise band to a stable object in front of you so it is at waist height.  3. Hold one end of the exercise band in each hand. Position your palms so that your thumbs are facing the ceiling (neutral position).  4. Bend each of your elbows to a 90-degree angle (right angle) and keep your upper arms at your sides.  5. Step back until the band is tight and there is no slack.  6. Slowly pull your elbows back behind you.  7. Hold for _____5_____ seconds.  8. Slowly return to the starting position.  Repeat ______3____ times. Complete this exercise ____3______ times a day.  Shoulder press-ups      1. Sit in a stable chair that has armrests. Sit upright, with your feet flat on the floor.  2. Put your hands on the armrests so your elbows are bent and your fingers are pointing forward. Your hands should be about even with the sides of your body.  3. Push down on the armrests and  use your arms to lift yourself off the chair. Straighten your elbows and lift yourself up as much as you comfortably can.  ? Move your shoulder blades down, and avoid letting your shoulders move up toward your ears.  ? Keep your feet on the ground. As you get stronger, your feet should support less of your body weight as you lift yourself up.  4. Hold for ______5____ seconds.  5. Slowly lower yourself back into the chair.  Repeat _____3_____ times. Complete this exercise _____3_____ times a day.  Wall push-ups      1. Stand so you are facing a stable wall. Your feet should be about one arm-length away from the wall.  2. Lean forward and place your palms on the wall at shoulder height.  3. Keep your feet flat on the floor as you bend your elbows and lean forward toward the wall.  4. Hold for _____5_____ seconds.  5. Straighten your elbows to push yourself back to the starting position.  Repeat ____3______ times. Complete this exercise ____3______ times a day.  This information is not intended to replace advice given to you by your health care provider. Make sure you discuss any questions you have with your health care provider.  Document Released: 11/01/2006 Document Revised: 04/10/2020 Document Reviewed: 01/17/2020  [employer] Patient Education © 2020 [employer]        Return in about 6 weeks (around 8/24/2020).    Ambulatory progress note signed and attested to by Gm Morrison D.O.           Current Outpatient Medications:   •  aspirin 81 MG chewable tablet, Chew 1 tablet Daily., Disp: 90 tablet, Rfl: 0  •  nitroglycerin (NITROSTAT) 0.4 MG SL tablet, Place 1 tablet under the tongue Every 5 (Five) Minutes As Needed for Chest Pain. Take no more than 3 doses in 15 minutes., Disp: 25 tablet, Rfl: 0  •  prasugrel (EFFIENT) 10 MG tablet, Take 1 tablet by mouth Daily., Disp: 30 tablet, Rfl: 11  •  rosuvastatin (CRESTOR) 20 MG tablet, Take 1 tablet by mouth Every Night., Disp: 90 tablet, Rfl: 0

## 2020-08-10 DIAGNOSIS — E78.5 HYPERLIPIDEMIA LDL GOAL <70: ICD-10-CM

## 2020-08-10 RX ORDER — ROSUVASTATIN CALCIUM 20 MG/1
20 TABLET, COATED ORAL NIGHTLY
Qty: 90 TABLET | Refills: 1 | Status: SHIPPED | OUTPATIENT
Start: 2020-08-10 | End: 2021-02-15 | Stop reason: SDUPTHER

## 2020-08-25 ENCOUNTER — OFFICE VISIT (OUTPATIENT)
Dept: FAMILY MEDICINE CLINIC | Facility: CLINIC | Age: 55
End: 2020-08-25

## 2020-08-25 VITALS
OXYGEN SATURATION: 98 % | HEART RATE: 50 BPM | DIASTOLIC BLOOD PRESSURE: 64 MMHG | SYSTOLIC BLOOD PRESSURE: 102 MMHG | HEIGHT: 71 IN | BODY MASS INDEX: 27.44 KG/M2 | WEIGHT: 196 LBS

## 2020-08-25 DIAGNOSIS — S46.001A INJURY OF RIGHT ROTATOR CUFF, INITIAL ENCOUNTER: Primary | ICD-10-CM

## 2020-08-25 DIAGNOSIS — S43.431A LABRAL TEAR OF SHOULDER, RIGHT, INITIAL ENCOUNTER: ICD-10-CM

## 2020-08-25 PROCEDURE — 99213 OFFICE O/P EST LOW 20 MIN: CPT | Performed by: FAMILY MEDICINE

## 2020-09-08 NOTE — PROGRESS NOTES
Subjective   Manuel Sauceda is a 54 y.o. male.     Chief Complaint   Patient presents with   • Follow-up       History of Present Illness     Manuel Sauceda presents today for   Chief Complaint   Patient presents with   • Follow-up     He previously had an injury to his right rotator cuff.  Injury greater than 6 weeks ago, fell and caught himself.  He has had persistent pain ever since that time despite home physical therapy exercises.    This patient is accompanied by their self who contributes to the history of their care.    The following portions of the patient's history were reviewed and updated as appropriate: allergies, current medications, past family history, past medical history, past social history, past surgical history and problem list.    Active Ambulatory Problems     Diagnosis Date Noted   • Coronary artery disease involving native coronary artery of native heart 01/17/2020   • Hyperlipidemia LDL goal <70 01/17/2020   • Ventricular tachycardia (paroxysmal) (CMS/[facility]) 01/17/2020   • Bradycardia, sinus 03/03/2020     Resolved Ambulatory Problems     Diagnosis Date Noted   • STEMI involving left circumflex coronary artery (CMS/[facility]) 01/17/2020     Past Medical History:   Diagnosis Date   • Myocardial infarction (CMS/[facility])      Past Surgical History:   Procedure Laterality Date   • CARDIAC CATHETERIZATION     • CARDIAC CATHETERIZATION N/A 1/17/2020    Procedure: Left Heart Cath;  Surgeon: Shakeel Cox IV, MD;  Location:  [facility];  Service: Cardiology   • CARDIAC CATHETERIZATION  1/17/2020    Procedure: Percutaneous Mechanical Thrombectomy;  Surgeon: Shakeel Cox IV, MD;  Location:  [facility];  Service: Cardiology   • CARDIAC CATHETERIZATION N/A 1/17/2020    Procedure: Stent OTONIEL coronary;  Surgeon: Shakeel Cox IV, MD;  Location:  [facility];  Service: Cardiology   • ROTATOR CUFF REPAIR Left   "    Family History   Problem Relation Age of Onset   • Heart attack Father 60   • Hyperlipidemia Father    • Heart attack Brother 45   • Thyroid disease Sister    • Thyroid disease Daughter      Social History     Socioeconomic History   • Marital status:      Spouse name: Not on file   • Number of children: Not on file   • Years of education: Not on file   • Highest education level: Not on file   Tobacco Use   • Smoking status: Former Smoker     Last attempt to quit: 2015     Years since quittin.6   • Smokeless tobacco: Never Used   Substance and Sexual Activity   • Alcohol use: Yes     Alcohol/week: 4.0 standard drinks     Types: 4 Cans of beer per week   • Drug use: No   • Sexual activity: Defer       Review of Systems  Review of Systems -  General ROS: negative for - chills, fever or night sweats  Cardiovascular ROS: no chest pain or dyspnea on exertion  Gastrointestinal ROS: no abdominal pain, change in bowel habits, or black or bloody stools  Genito-Urinary ROS: no dysuria, trouble voiding, or hematuria    Objective   Blood pressure 102/64, pulse 50, height 180.3 cm (71\"), weight 88.9 kg (196 lb), SpO2 98 %.  Nursing note reviewed  Physical Exam  Const: NAD, A&Ox4, Pleasant, Cooperative  Eyes: EOMI, no conjunctivitis  ENT: No nasal discharge present, neck supple  Cardiac: Regular rate and rhythm, no cyanosis  Resp: Respiratory rate within normal limits, no increased work of breathing, no audible wheezing or retractions noted  GI: No distention or ascites  MSK: Examination of the right shoulder reveals anterior tenderness to palpation.  No effusion.  Positive empty can, positive [place], rotator cuff strength 5/5.  Pain with resisted supination  Procedures  Assessment/Plan   Problem List Items Addressed This Visit     None      Visit Diagnoses     Injury of right rotator cuff, initial encounter    -  Primary    Relevant Orders    MRI Shoulder Right Arthrogram    Ambulatory Referral to Orthopedic " Surgery    Labral tear of shoulder, right, initial encounter        Relevant Orders    MRI Shoulder Right Arthrogram    Ambulatory Referral to Orthopedic Surgery        #1 right rotator cuff injury  Biceps tendon injury versus labral tear  MRI ordered, referred to orthopedic surgery today    See patient diagnoses and orders along with patient instructions for assessment, plan, and changes to care for patient.    There are no Patient Instructions on file for this visit.    No follow-ups on file.    Ambulatory progress note signed and attested to by Gm Morrison D.O.

## 2020-09-25 DIAGNOSIS — R42 DIZZINESS: Primary | ICD-10-CM

## 2020-09-30 RX ORDER — SODIUM, POTASSIUM,MAG SULFATES 17.5-3.13G
2 SOLUTION, RECONSTITUTED, ORAL ORAL TAKE AS DIRECTED
Qty: 354 ML | Refills: 0 | Status: SHIPPED | OUTPATIENT
Start: 2020-09-30 | End: 2020-10-06

## 2020-10-04 ENCOUNTER — APPOINTMENT (OUTPATIENT)
Dept: PREADMISSION TESTING | Facility: HOSPITAL | Age: 55
End: 2020-10-04

## 2020-10-06 ENCOUNTER — LAB (OUTPATIENT)
Dept: LAB | Facility: HOSPITAL | Age: 55
End: 2020-10-06

## 2020-10-06 ENCOUNTER — OFFICE VISIT (OUTPATIENT)
Dept: FAMILY MEDICINE CLINIC | Facility: CLINIC | Age: 55
End: 2020-10-06

## 2020-10-06 VITALS
BODY MASS INDEX: 27.72 KG/M2 | SYSTOLIC BLOOD PRESSURE: 124 MMHG | HEIGHT: 71 IN | DIASTOLIC BLOOD PRESSURE: 76 MMHG | OXYGEN SATURATION: 99 % | HEART RATE: 62 BPM | WEIGHT: 198 LBS

## 2020-10-06 DIAGNOSIS — R20.2 PARESTHESIA OF BOTH FEET: ICD-10-CM

## 2020-10-06 DIAGNOSIS — R42 DIZZINESS: ICD-10-CM

## 2020-10-06 DIAGNOSIS — S43.431A LABRAL TEAR OF SHOULDER, RIGHT, INITIAL ENCOUNTER: ICD-10-CM

## 2020-10-06 DIAGNOSIS — S46.001A INJURY OF RIGHT ROTATOR CUFF, INITIAL ENCOUNTER: Primary | ICD-10-CM

## 2020-10-06 PROCEDURE — 99214 OFFICE O/P EST MOD 30 MIN: CPT | Performed by: FAMILY MEDICINE

## 2020-10-06 PROCEDURE — 82746 ASSAY OF FOLIC ACID SERUM: CPT

## 2020-10-06 PROCEDURE — 82607 VITAMIN B-12: CPT

## 2020-10-06 PROCEDURE — 86140 C-REACTIVE PROTEIN: CPT

## 2020-10-06 PROCEDURE — 80048 BASIC METABOLIC PNL TOTAL CA: CPT

## 2020-10-06 PROCEDURE — 84439 ASSAY OF FREE THYROXINE: CPT

## 2020-10-06 PROCEDURE — 84207 ASSAY OF VITAMIN B-6: CPT

## 2020-10-06 PROCEDURE — 84443 ASSAY THYROID STIM HORMONE: CPT

## 2020-10-06 PROCEDURE — 84480 ASSAY TRIIODOTHYRONINE (T3): CPT

## 2020-10-06 PROCEDURE — 85027 COMPLETE CBC AUTOMATED: CPT

## 2020-10-06 NOTE — PATIENT INSTRUCTIONS
1.  Lab work today across the street    2.  Holter follow up with cardiology    3.  If both those are normal, consider anxiety medication

## 2020-10-06 NOTE — PROGRESS NOTES
Subjective   Manuel Sauceda is a 54 y.o. male.     Chief Complaint   Patient presents with   • Shoulder Pain     MRI order    • Dizziness     Light headed today        Dizziness  This is a new problem. Episode onset: 2 weeks. The problem occurs daily. The problem has been unchanged. Associated symptoms include fatigue. Pertinent negatives include no abdominal pain, anorexia, arthralgias, change in bowel habit, chest pain, chills, congestion, coughing or fever.        Manuel Sauceda presents today for   Chief Complaint   Patient presents with   • Shoulder Pain     MRI order    • Dizziness     Light headed today      Getting tingly feeling in feet all the time now. Dizzy, lightheaded if he stands up too fast. Feels fatigued, tired at the end of the day. Feels like he did after he had his MI.  On holter monitor right now per cardiology    Answers for HPI/ROS submitted by the patient on 10/5/2020   What is the primary reason for your visit?: Other  Please describe your symptoms.: Fatigue, dizziness, lightheadedness, tingling in legs, hands and feet go numb throughout the day.  Have you had these symptoms before?: Yes  How long have you been having these symptoms?: 1-2 weeks      This patient is accompanied by their self who contributes to the history of their care.    The following portions of the patient's history were reviewed and updated as appropriate: allergies, current medications, past family history, past medical history, past social history, past surgical history and problem list.    Active Ambulatory Problems     Diagnosis Date Noted   • Coronary artery disease involving native coronary artery of native heart 01/17/2020   • Hyperlipidemia LDL goal <70 01/17/2020   • Ventricular tachycardia (paroxysmal) (CMS/[facility]) 01/17/2020   • Bradycardia, sinus 03/03/2020     Resolved Ambulatory Problems     Diagnosis Date Noted   • STEMI involving left circumflex coronary artery (CMS/[facility]) 01/17/2020     Past  "Medical History:   Diagnosis Date   • Myocardial infarction (CMS/HCC)      Past Surgical History:   Procedure Laterality Date   • CARDIAC CATHETERIZATION     • CARDIAC CATHETERIZATION N/A 2020    Procedure: Left Heart Cath;  Surgeon: Shakeel Cox IV, MD;  Location:  [facility];  Service: Cardiology   • CARDIAC CATHETERIZATION  2020    Procedure: Percutaneous Mechanical Thrombectomy;  Surgeon: Shakeel Cox IV, MD;  Location:  [facility];  Service: Cardiology   • CARDIAC CATHETERIZATION N/A 2020    Procedure: Stent OTONIEL coronary;  Surgeon: Shakeel Cox IV, MD;  Location:  [facility];  Service: Cardiology   • ROTATOR CUFF REPAIR Left      Family History   Problem Relation Age of Onset   • Heart attack Father 60   • Hyperlipidemia Father    • Heart attack Brother 45   • Thyroid disease Sister    • Thyroid disease Daughter      Social History     Socioeconomic History   • Marital status:      Spouse name: Not on file   • Number of children: Not on file   • Years of education: Not on file   • Highest education level: Not on file   Tobacco Use   • Smoking status: Former Smoker     Quit date: 2015     Years since quittin.7   • Smokeless tobacco: Never Used   Substance and Sexual Activity   • Alcohol use: Yes     Alcohol/week: 4.0 standard drinks     Types: 4 Cans of beer per week   • Drug use: No   • Sexual activity: Defer       Review of Systems   Constitutional: Positive for fatigue. Negative for chills and fever.   HENT: Negative for congestion.    Respiratory: Negative for cough.    Cardiovascular: Negative for chest pain.   Gastrointestinal: Negative for abdominal pain, anorexia and change in bowel habit.   Musculoskeletal: Negative for arthralgias.   Neurological: Positive for dizziness.     Objective   Blood pressure 124/76, pulse 62, height 180.3 cm (71\"), weight 89.8 kg (198 lb), SpO2 99 " %.  Nursing note reviewed  Physical Exam  Const: NAD, A&Ox4, Pleasant, Cooperative  Eyes: EOMI, no conjunctivitis  ENT: No nasal discharge present, neck supple  Cardiac: Regular rate and rhythm, no cyanosis  Resp: Respiratory rate within normal limits, no increased work of breathing, no audible wheezing or retractions noted  GI: No distention or ascites  MSK: Still having same shoulder pain, exam unchanged  Procedures  Assessment/Plan   Problem List Items Addressed This Visit     None      Visit Diagnoses     Injury of right rotator cuff, initial encounter    -  Primary    Relevant Orders    FL Contrast Injection CT / MRI    MRI shoulder right arthrogram    Labral tear of shoulder, right, initial encounter        Relevant Orders    FL Contrast Injection CT / MRI    MRI shoulder right arthrogram    Dizziness        Relevant Orders    TSH    T3    T4, Free    Basic Metabolic Panel    CBC (No Diff)    C-reactive Protein    Vitamin B6    Vitamin B12    Folate    Paresthesia of both feet        Relevant Orders    TSH    T3    T4, Free    Basic Metabolic Panel    CBC (No Diff)    C-reactive Protein    Vitamin B6    Vitamin B12    Folate          See patient diagnoses and orders along with patient instructions for assessment, plan, and changes to care for patient.    Patient Instructions   1.  Lab work today across the street    2.  Holter follow up with cardiology    3.  If both those are normal, consider anxiety medication      No follow-ups on file.    Ambulatory progress note signed and attested to by Gm Morrison D.O.

## 2020-10-07 LAB
ANION GAP SERPL CALCULATED.3IONS-SCNC: 11.7 MMOL/L (ref 5–15)
BUN SERPL-MCNC: 22 MG/DL (ref 6–20)
BUN/CREAT SERPL: 24.2 (ref 7–25)
CALCIUM SPEC-SCNC: 9.6 MG/DL (ref 8.6–10.5)
CHLORIDE SERPL-SCNC: 99 MMOL/L (ref 98–107)
CO2 SERPL-SCNC: 26.3 MMOL/L (ref 22–29)
CREAT SERPL-MCNC: 0.91 MG/DL (ref 0.76–1.27)
CRP SERPL-MCNC: 0.04 MG/DL (ref 0–0.5)
DEPRECATED RDW RBC AUTO: 43.7 FL (ref 37–54)
ERYTHROCYTE [DISTWIDTH] IN BLOOD BY AUTOMATED COUNT: 12.6 % (ref 12.3–15.4)
FOLATE SERPL-MCNC: 15.1 NG/ML (ref 4.78–24.2)
GFR SERPL CREATININE-BSD FRML MDRD: 87 ML/MIN/1.73
GLUCOSE SERPL-MCNC: 86 MG/DL (ref 65–99)
HCT VFR BLD AUTO: 42.2 % (ref 37.5–51)
HGB BLD-MCNC: 14.4 G/DL (ref 13–17.7)
MCH RBC QN AUTO: 32.1 PG (ref 26.6–33)
MCHC RBC AUTO-ENTMCNC: 34.1 G/DL (ref 31.5–35.7)
MCV RBC AUTO: 94 FL (ref 79–97)
PLATELET # BLD AUTO: 170 10*3/MM3 (ref 140–450)
PMV BLD AUTO: 12 FL (ref 6–12)
POTASSIUM SERPL-SCNC: 4 MMOL/L (ref 3.5–5.2)
RBC # BLD AUTO: 4.49 10*6/MM3 (ref 4.14–5.8)
SODIUM SERPL-SCNC: 137 MMOL/L (ref 136–145)
T3 SERPL-MCNC: 158 NG/DL (ref 80–200)
T4 FREE SERPL-MCNC: 1.42 NG/DL (ref 0.93–1.7)
TSH SERPL DL<=0.05 MIU/L-ACNC: 1.8 UIU/ML (ref 0.27–4.2)
VIT B12 BLD-MCNC: 332 PG/ML (ref 211–946)
WBC # BLD AUTO: 7.51 10*3/MM3 (ref 3.4–10.8)

## 2020-10-11 LAB — VIT B6 SERPL-MCNC: 19.4 UG/L (ref 5.3–46.7)

## 2020-10-16 RX ORDER — PRASUGREL 10 MG/1
TABLET, FILM COATED ORAL
Qty: 30 TABLET | Refills: 11 | Status: SHIPPED | OUTPATIENT
Start: 2020-10-16 | End: 2020-12-08

## 2020-10-23 ENCOUNTER — HOSPITAL ENCOUNTER (OUTPATIENT)
Dept: GENERAL RADIOLOGY | Facility: HOSPITAL | Age: 55
Discharge: HOME | End: 2020-10-23

## 2020-10-23 ENCOUNTER — HOSPITAL ENCOUNTER (OUTPATIENT)
Dept: MRI IMAGING | Facility: HOSPITAL | Age: 55
Discharge: HOME | End: 2020-10-23

## 2020-10-23 DIAGNOSIS — S46.001A INJURY OF RIGHT ROTATOR CUFF, INITIAL ENCOUNTER: ICD-10-CM

## 2020-10-23 DIAGNOSIS — S43.431A LABRAL TEAR OF SHOULDER, RIGHT, INITIAL ENCOUNTER: ICD-10-CM

## 2020-10-23 PROCEDURE — 73222 MRI JOINT UPR EXTREM W/DYE: CPT

## 2020-10-23 PROCEDURE — 0 GADOBENATE DIMEGLUMINE 529 MG/ML SOLUTION: Performed by: FAMILY MEDICINE

## 2020-10-23 PROCEDURE — A9577 INJ MULTIHANCE: HCPCS | Performed by: FAMILY MEDICINE

## 2020-10-23 PROCEDURE — 77002 NEEDLE LOCALIZATION BY XRAY: CPT

## 2020-10-23 PROCEDURE — 25010000002 IOPAMIDOL 61 % SOLUTION: Performed by: FAMILY MEDICINE

## 2020-10-23 RX ORDER — LIDOCAINE HYDROCHLORIDE 10 MG/ML
5 INJECTION, SOLUTION EPIDURAL; INFILTRATION; INTRACAUDAL; PERINEURAL ONCE
Status: COMPLETED | OUTPATIENT
Start: 2020-10-23 | End: 2020-10-23

## 2020-10-23 RX ADMIN — GADOBENATE DIMEGLUMINE 90 ML: 529 INJECTION, SOLUTION INTRAVENOUS at 14:34

## 2020-10-23 RX ADMIN — LIDOCAINE HYDROCHLORIDE 5 ML: 10 INJECTION, SOLUTION EPIDURAL; INFILTRATION; INTRACAUDAL; PERINEURAL at 14:35

## 2020-10-23 RX ADMIN — IOPAMIDOL 10 ML: 612 INJECTION, SOLUTION INTRAVENOUS at 14:35

## 2020-12-04 NOTE — PROGRESS NOTES
[facility]  Office Visit Note    DATE: 12/08/2020    IDENTIFICATION: Manuel Sauceda is a 55 y.o. male who resides in [place], [place]. He works for [employer].    REASON FOR VISIT:  • STEMI in January 2020.  • Hyperlipidemia            Manuel Sauceda returns to the office today in follow-up of his inferior lateral STEMI in January 2020.  The patient is doing well and has resumed all his normal activities.  He cycles 20 miles a week, does yoga several times a week and is exercising on a daily basis.  He denies anginal symptoms.  He is tolerating rosuvastatin and DAPT.    Review of Systems   Constitution: Negative for malaise/fatigue.   Eyes: Negative for vision loss in left eye and vision loss in right eye.   Cardiovascular: Negative.  Negative for chest pain, dyspnea on exertion, near-syncope, orthopnea, palpitations, paroxysmal nocturnal dyspnea and syncope.   Respiratory: Negative.    Musculoskeletal: Negative for myalgias.   Neurological: Negative for brief paralysis, excessive daytime sleepiness, focal weakness, numbness, paresthesias and weakness.   All other systems reviewed and are negative.      The patient's past medical, social, family history and ROS reviewed in the patient's electronic medical record.    Allergies   Allergen Reactions   • Erythromycin Nausea And Vomiting         Current Outpatient Medications:   •  aspirin 81 MG chewable tablet, Chew 1 tablet Daily., Disp: 90 tablet, Rfl: 0  •  nitroglycerin (NITROSTAT) 0.4 MG SL tablet, Place 1 tablet under the tongue Every 5 (Five) Minutes As Needed for Chest Pain. Take no more than 3 doses in 15 minutes., Disp: 25 tablet, Rfl: 5  •  rosuvastatin (CRESTOR) 20 MG tablet, TAKE 1 TABLET BY MOUTH EVERY NIGHT, Disp: 90 tablet, Rfl: 1    Past Medical History:   Diagnosis Date   • Myocardial infarction (CMS/HCC)        Past Surgical History:   Procedure Laterality Date   • CARDIAC CATHETERIZATION     •  "CARDIAC CATHETERIZATION N/A 2020    Procedure: Left Heart Cath;  Surgeon: Shakeel Cox IV, MD;  Location:  [facility];  Service: Cardiology   • CARDIAC CATHETERIZATION  2020    Procedure: Percutaneous Mechanical Thrombectomy;  Surgeon: Shakeel Cox IV, MD;  Location:  [facility];  Service: Cardiology   • CARDIAC CATHETERIZATION N/A 2020    Procedure: Stent OTONIEL coronary;  Surgeon: Shakeel Cox IV, MD;  Location:  [facility];  Service: Cardiology   • ROTATOR CUFF REPAIR Left        Family History   Problem Relation Age of Onset   • Heart attack Father 60   • Hyperlipidemia Father    • Heart attack Brother 45   • Thyroid disease Sister    • Thyroid disease Daughter        Social History     Tobacco Use   • Smoking status: Former Smoker     Quit date: 2015     Years since quittin.8   • Smokeless tobacco: Never Used   Substance Use Topics   • Alcohol use: Yes     Alcohol/week: 4.0 standard drinks     Types: 4 Cans of beer per week           Blood pressure 114/66, pulse 71, temperature 96.6 °F (35.9 °C), height 180.3 cm (71\"), weight 92.5 kg (204 lb), SpO2 98 %.  Body mass index is 28.45 kg/m².  Vitals:    20 1448   Patient Position: Sitting       Constitutional:       Appearance: Well-developed.   Eyes:      General: No scleral icterus.     Pupils: Pupils are equal, round, and reactive to light.   HENT:      Head: Normocephalic and atraumatic.   Neck:      Thyroid: No thyromegaly.      Vascular: No carotid bruit or JVD.   Pulmonary:      Effort: Pulmonary effort is normal.      Breath sounds: Normal breath sounds.   Cardiovascular:      Normal rate. Regular rhythm.      Murmurs: There is no murmur.      No gallop.   Abdominal:      Palpations: Abdomen is soft. There is no abdominal mass.      Tenderness: There is no abdominal tenderness.   Musculoskeletal:      Extremities: No clubbing " present.  Skin:     General: Skin is warm and dry. There is no cyanosis.   Neurological:      Mental Status: Alert and oriented to person, place, and time.   Psychiatric:         Behavior: Behavior normal.         Data Review (reviewed with patient):     Procedures    Lab Results   Component Value Date    GLUCOSE 86 10/06/2020    BUN 22 (H) 10/06/2020    CREATININE 0.91 10/06/2020    EGFRIFNONA 87 10/06/2020    BCR 24.2 10/06/2020    K 4.0 10/06/2020    CO2 26.3 10/06/2020    CALCIUM 9.6 10/06/2020    ALBUMIN 4.50 03/03/2020    ALKPHOS 51 03/03/2020    AST 28 03/03/2020    ALT 25 03/03/2020      Lab Results   Component Value Date    CHOL 114 03/03/2020    TRIG 98 03/03/2020    HDL 49 03/03/2020    LDL 45 03/03/2020     Lab Results   Component Value Date    HGBA1C 5.30 01/18/2020     Lab Results   Component Value Date    WBC 7.51 10/06/2020    HGB 14.4 10/06/2020    HCT 42.2 10/06/2020    MCV 94.0 10/06/2020     10/06/2020     Lab Results   Component Value Date    TSH 1.800 10/06/2020            Problem List Items Addressed This Visit        Cardiology Problems    Coronary artery disease involving native coronary artery of native heart with angina pectoris (CMS/[facility]) - Primary    Overview     · Cardiac catheterization for inferior lateral STEMI (1/17/2020): 100% thrombotic occlusion of the mid circumflex status post overlapping Xience OTONIEL.  Mild disease of LAD/RCA.  LVEF 42%  · Echocardiogram (1/17/2020): EF 60%. Mild MR.         Current Assessment & Plan     · No anginal symptoms despite active lifestyle  · Discontinue prasugrel starting January 2021  · Continue low-dose aspirin and high intensity statin  · No beta-blocker due to bradycardia         Relevant Medications    nitroglycerin (NITROSTAT) 0.4 MG SL tablet    aspirin 81 MG chewable tablet    Hyperlipidemia LDL goal <70    Overview     · High intensity statin therapy indicated given the presence of CAD         Current Assessment & Plan      · Well-controlled  · Continue rosuvastatin                    · Discontinue prasurgrel starting 1/2021  Return in about 1 year (around 12/8/2021) for Follow-up with [facility] only.      Shakeel Cox IV MD, [facility], [facility]  12/8/2020

## 2020-12-08 ENCOUNTER — TELEPHONE (OUTPATIENT)
Dept: FAMILY MEDICINE CLINIC | Facility: CLINIC | Age: 55
End: 2020-12-08

## 2020-12-08 ENCOUNTER — OFFICE VISIT (OUTPATIENT)
Dept: CARDIOLOGY | Facility: CLINIC | Age: 55
End: 2020-12-08

## 2020-12-08 ENCOUNTER — TELEMEDICINE (OUTPATIENT)
Dept: FAMILY MEDICINE CLINIC | Facility: CLINIC | Age: 55
End: 2020-12-08

## 2020-12-08 VITALS
HEIGHT: 71 IN | SYSTOLIC BLOOD PRESSURE: 114 MMHG | BODY MASS INDEX: 28.56 KG/M2 | DIASTOLIC BLOOD PRESSURE: 66 MMHG | WEIGHT: 204 LBS | OXYGEN SATURATION: 98 % | TEMPERATURE: 96.6 F | HEART RATE: 71 BPM

## 2020-12-08 DIAGNOSIS — M25.562 ACUTE PAIN OF LEFT KNEE: Primary | ICD-10-CM

## 2020-12-08 DIAGNOSIS — E78.5 HYPERLIPIDEMIA LDL GOAL <70: ICD-10-CM

## 2020-12-08 DIAGNOSIS — I25.119 CORONARY ARTERY DISEASE INVOLVING NATIVE CORONARY ARTERY OF NATIVE HEART WITH ANGINA PECTORIS (HCC): Primary | ICD-10-CM

## 2020-12-08 PROBLEM — R00.1 BRADYCARDIA, SINUS: Status: RESOLVED | Noted: 2020-03-03 | Resolved: 2020-12-08

## 2020-12-08 PROCEDURE — 99213 OFFICE O/P EST LOW 20 MIN: CPT | Performed by: INTERNAL MEDICINE

## 2020-12-08 PROCEDURE — 99213 OFFICE O/P EST LOW 20 MIN: CPT | Performed by: FAMILY MEDICINE

## 2020-12-08 RX ORDER — ASPIRIN 81 MG/1
81 TABLET, CHEWABLE ORAL DAILY
Qty: 90 TABLET | Refills: 0
Start: 2020-12-08

## 2020-12-08 RX ORDER — PRASUGREL 10 MG/1
10 TABLET, FILM COATED ORAL DAILY
Qty: 90 TABLET | Refills: 1 | Status: CANCELLED | OUTPATIENT
Start: 2020-12-08

## 2020-12-08 RX ORDER — NITROGLYCERIN 0.4 MG/1
0.4 TABLET SUBLINGUAL
Qty: 25 TABLET | Refills: 5 | Status: SHIPPED | OUTPATIENT
Start: 2020-12-08 | End: 2021-12-14 | Stop reason: SDUPTHER

## 2020-12-08 NOTE — TELEPHONE ENCOUNTER
RAMA TO SET UP HIS 1 WEEK F/U APPT WITH DR. ANGUIANO. DR ANGUIANO WOULD LIKE A 30 MIN POSSIBLE INJECTION APPT FOR HIS KNEE EVAL.

## 2020-12-08 NOTE — PROGRESS NOTES
"Subjective   Manuel Sauceda is a 55 y.o. male.     Chief Complaint   Patient presents with   • Knee Pain     left- acute flare       History of Present Illness     Manuel Sauceda presents today for   Chief Complaint   Patient presents with   • Knee Pain     left- acute flare     Felt something snap crackle and pop in his knee last week. Was up to running 5 miles per day until then. Initial injury ~2 years ago. 6 weeks ago flared. Medial aspect of knee, \"like a backwards C\". Now doing elliptical. Using biofreeze, CBD. Pain is tolerable 3/10 during day, seems to worsen at night.    You have chosen to receive care through a telehealth visit.  Do you consent to use a video/audio connection for your medical care today? Yes    The following portions of the patient's history were reviewed and updated as appropriate: allergies, current medications, past family history, past medical history, past social history, past surgical history and problem list.    Active Ambulatory Problems     Diagnosis Date Noted   • Coronary artery disease involving native coronary artery of native heart 01/17/2020   • Hyperlipidemia LDL goal <70 01/17/2020   • Ventricular tachycardia (paroxysmal) (CMS/HCC) 01/17/2020   • Bradycardia, sinus 03/03/2020     Resolved Ambulatory Problems     Diagnosis Date Noted   • STEMI involving left circumflex coronary artery (CMS/HCC) 01/17/2020     Past Medical History:   Diagnosis Date   • Myocardial infarction (CMS/[facility])      Past Surgical History:   Procedure Laterality Date   • CARDIAC CATHETERIZATION     • CARDIAC CATHETERIZATION N/A 1/17/2020    Procedure: Left Heart Cath;  Surgeon: Shakeel Cox IV, MD;  Location:  [facility];  Service: Cardiology   • CARDIAC CATHETERIZATION  1/17/2020    Procedure: Percutaneous Mechanical Thrombectomy;  Surgeon: Shakeel Cox IV, MD;  Location:  [facility];  Service: Cardiology   • CARDIAC " CATHETERIZATION N/A 2020    Procedure: Stent OTONIEL coronary;  Surgeon: Shakeel Cox IV, MD;  Location: [facility] CATH INVASIVE LOCATION;  Service: Cardiology   • ROTATOR CUFF REPAIR Left      Family History   Problem Relation Age of Onset   • Heart attack Father 60   • Hyperlipidemia Father    • Heart attack Brother 45   • Thyroid disease Sister    • Thyroid disease Daughter      Social History     Socioeconomic History   • Marital status:      Spouse name: Not on file   • Number of children: Not on file   • Years of education: Not on file   • Highest education level: Not on file   Tobacco Use   • Smoking status: Former Smoker     Quit date: 2015     Years since quittin.8   • Smokeless tobacco: Never Used   Substance and Sexual Activity   • Alcohol use: Yes     Alcohol/week: 4.0 standard drinks     Types: 4 Cans of beer per week   • Drug use: No   • Sexual activity: Defer       Review of Systems  Review of Systems -  General ROS: negative for - chills, fever or night sweats  Cardiovascular ROS: no chest pain or dyspnea on exertion  Gastrointestinal ROS: no abdominal pain, change in bowel habits, or black or bloody stools  Genito-Urinary ROS: no dysuria, trouble voiding, or hematuria    Objective   There were no vitals taken for this visit.  Vitals obtained from patient if available  Physical Exam  Const: Non-toxic appearing, NAD, A&Ox4, Pleasant, Cooperative  Eyes: EOMI, no conjunctivitis  ENT: No copious nasal drainage noted  Cardiac: Regular rate by pulse  Resp: Respiratory rate observed to be within normal limits, no increased work of breathing observed, no audible wheezing or cough noted  Psych: Appropriate mood and behavior.  Procedures  Assessment/Plan   Problem List Items Addressed This Visit     None      Visit Diagnoses     Acute pain of left knee    -  Primary    Acute 6 weeks flare. Given chronicity and nocturnal worsening I would like him to have xray and RTC next week for  eval and possible cortisone injection    Relevant Orders    XR Knee 1 or 2 View Left          See patient diagnoses and orders along with patient instructions for assessment, plan, and changes to care for patient.    This visit was conducted via telemedicine with live video and audio provided through Video Options: MyChart/Zoom at the point of care.    Patient Instructions   1.  Please have xray completed prior to our appt      Return in about 1 week (around 12/15/2020).    Ambulatory progress note signed and attested to by Gm Morrison D.O.

## 2020-12-08 NOTE — ASSESSMENT & PLAN NOTE
· No anginal symptoms despite active lifestyle  · Discontinue prasugrel starting January 2021  · Continue low-dose aspirin and high intensity statin  · No beta-blocker due to bradycardia

## 2020-12-09 ENCOUNTER — HOSPITAL ENCOUNTER (OUTPATIENT)
Dept: GENERAL RADIOLOGY | Facility: HOSPITAL | Age: 55
Discharge: HOME | End: 2020-12-09
Admitting: FAMILY MEDICINE

## 2020-12-09 DIAGNOSIS — M25.562 ACUTE PAIN OF LEFT KNEE: ICD-10-CM

## 2020-12-09 PROCEDURE — 73560 X-RAY EXAM OF KNEE 1 OR 2: CPT

## 2020-12-17 ENCOUNTER — OFFICE VISIT (OUTPATIENT)
Dept: FAMILY MEDICINE CLINIC | Facility: CLINIC | Age: 55
End: 2020-12-17

## 2020-12-17 VITALS
SYSTOLIC BLOOD PRESSURE: 120 MMHG | OXYGEN SATURATION: 97 % | HEIGHT: 71 IN | RESPIRATION RATE: 16 BRPM | HEART RATE: 67 BPM | BODY MASS INDEX: 28.08 KG/M2 | DIASTOLIC BLOOD PRESSURE: 82 MMHG | WEIGHT: 200.6 LBS

## 2020-12-17 DIAGNOSIS — M25.562 ACUTE PAIN OF LEFT KNEE: Primary | ICD-10-CM

## 2020-12-17 DIAGNOSIS — M17.12 PRIMARY OSTEOARTHRITIS OF LEFT KNEE: ICD-10-CM

## 2020-12-17 PROCEDURE — 99213 OFFICE O/P EST LOW 20 MIN: CPT | Performed by: FAMILY MEDICINE

## 2020-12-17 PROCEDURE — 20610 DRAIN/INJ JOINT/BURSA W/O US: CPT | Performed by: FAMILY MEDICINE

## 2020-12-17 RX ORDER — TRIAMCINOLONE ACETONIDE 40 MG/ML
40 INJECTION, SUSPENSION INTRA-ARTICULAR; INTRAMUSCULAR ONCE
Status: COMPLETED | OUTPATIENT
Start: 2020-12-17 | End: 2020-12-17

## 2020-12-17 RX ADMIN — TRIAMCINOLONE ACETONIDE 40 MG: 40 INJECTION, SUSPENSION INTRA-ARTICULAR; INTRAMUSCULAR at 15:08

## 2020-12-17 NOTE — PROGRESS NOTES
Subjective   Manuel Sauceda is a 55 y.o. male.     Chief Complaint   Patient presents with   • Knee Pain     injection left side       History of Present Illness     Manuel Sauceda presents today for   Chief Complaint   Patient presents with   • Knee Pain     injection left side     Patient presents today for evaluation of ongoing left knee pain.  He was seen 1 week ago for same..  His pain had improved slightly with resting off of running and some yoga he is doing.  Pain is moderate 5/10 with activity, is more mild at rest.  Describes it as sharp and stabbing.  Mostly located on the medial aspect and peripatellar.    This patient is accompanied by their self who contributes to the history of their care.    The following portions of the patient's history were reviewed and updated as appropriate: allergies, current medications, past family history, past medical history, past social history, past surgical history and problem list.    Active Ambulatory Problems     Diagnosis Date Noted   • Coronary artery disease involving native coronary artery of native heart with angina pectoris (CMS/[facility]) 01/17/2020   • Hyperlipidemia LDL goal <70 01/17/2020   • Ventricular tachycardia (paroxysmal) (CMS/[facility]) 01/17/2020     Resolved Ambulatory Problems     Diagnosis Date Noted   • STEMI involving left circumflex coronary artery (CMS/[facility]) 01/17/2020   • Bradycardia, sinus 03/03/2020     Past Medical History:   Diagnosis Date   • Myocardial infarction (CMS/[facility])      Past Surgical History:   Procedure Laterality Date   • CARDIAC CATHETERIZATION     • CARDIAC CATHETERIZATION N/A 1/17/2020    Procedure: Left Heart Cath;  Surgeon: Shakeel Cox IV, MD;  Location:  [facility];  Service: Cardiology   • CARDIAC CATHETERIZATION  1/17/2020    Procedure: Percutaneous Mechanical Thrombectomy;  Surgeon: Shakeel Cox IV, MD;  Location:  [facility];  Service: Cardiology   •  "CARDIAC CATHETERIZATION N/A 2020    Procedure: Stent OTONIEL coronary;  Surgeon: Shakeel Cox IV, MD;  Location: [facility] CATH INVASIVE LOCATION;  Service: Cardiology   • ROTATOR CUFF REPAIR Left      Family History   Problem Relation Age of Onset   • Heart attack Father 60   • Hyperlipidemia Father    • Heart attack Brother 45   • Thyroid disease Sister    • Thyroid disease Daughter      Social History     Socioeconomic History   • Marital status:      Spouse name: Not on file   • Number of children: Not on file   • Years of education: Not on file   • Highest education level: Not on file   Tobacco Use   • Smoking status: Former Smoker     Quit date: 2015     Years since quittin.9   • Smokeless tobacco: Never Used   Substance and Sexual Activity   • Alcohol use: Yes     Alcohol/week: 4.0 standard drinks     Types: 4 Cans of beer per week   • Drug use: No   • Sexual activity: Defer       Review of Systems  Review of Systems -  General ROS: negative for - chills, fever or night sweats  MSK: see HPI  Skin: negative for recent or new-onset erythema or rash    Objective   Blood pressure 120/82, pulse 67, resp. rate 16, height 180.3 cm (70.98\"), weight 91 kg (200 lb 9.6 oz), SpO2 97 %.  Nursing note reviewed  Physical Exam  Const: NAD, A&Ox4, Pleasant, Cooperative  Skin: No overlying erythema  MSK:  Right knee: no erythema, warmth, ecchymosis, or effusion. No joint line tenderness. No pain with manipulation of patella.  AROM 0-140 degrees. Lachman's, anterior and posterior drawer, María's, and varus and valgus stress negative. Patellar grind test negative.     Left knee: no erythema, warmth, ecchymosis, trace effusion. No joint line tenderness. No pain with manipulation of patella.  AROM 0-140 degrees. Lachman's, anterior and posterior drawer, María's, and varus and valgus stress negative. Patellar grind test negative.     Neurovascularly intact bilaterally distal to injury.  Strength 5/5 " bilaterally with leg extension, leg flexion, dorsiflexion and plantarflexion  Procedures  Procedure: Injection of the left knee joint  Prior to performance of the knee injection, a discussion of this procedure and alternative treatments was conducted with the patient.  Possible complications were discussed, and all questions were answered.  An informed consent was reviewed with the patient, and a signed copy will be scanned into the chart.     Anterolateral Peripatellar Approach  An anterolateral peripatellar approach was used.  The patient is seated at the edge of the exam table with the left knee flexed to 90° with the lower leg overhanging the edge.  The site was identified and marked below the inferior pole of the patella and 2 cm laterally.  The area was aseptically prepped.  The site was topically anesthetized and distracted using ethyl chloride. A 25-gauge 1-1/2 inch needle was then used to introduce an injectate consisting of 1 cc of 40 mg/mL triamcinolone and 2 cc of 2% lidocaine. Proper placement was confirmed via joint fluid aspiration into the syringe.     The needle was withdrawn.  No bleeding was encountered.  The injection site was cleaned and a dry sterile dressing was applied.     The patient tolerated the procedure well without complications. he reported complete relief of pain within 5 minutes.    Assessment/Plan   Problem List Items Addressed This Visit     None      Visit Diagnoses     Acute pain of left knee    -  Primary    Cortisone injection received today, tolerated well    Relevant Medications    triamcinolone acetonide (KENALOG-40) injection 40 mg (Completed)    Primary osteoarthritis of left knee        Relevant Medications    triamcinolone acetonide (KENALOG-40) injection 40 mg (Completed)          Injection today, tolerated well. Imaging reviewed today at point of care together with patient. Post-injection instructions reviewed with patient. I would like to see him back in about 2-6  weeks to assess his progress and response to injection.     See patient diagnoses and orders along with patient instructions for assessment, plan, and changes to care for patient.    Patient Instructions   INSTRUCTIONS TO FOLLOW AFTER A CORTISONE INJECTION    1. The pain relieving medicine in the shot will wear off in the next 12-18 hours. After this, the pain at the site may return for a short time until the steroid takes effect 24-48 hours later.  2. Use ice tonight 30-60 minutes or longer to minimize swelling and discomfort that might follow the injection. (Either crushed ice in a plastic bag or crushed ice in an ice cap).  3. Begin heat tomorrow for at least one hour every day for one week. Place hot moist towels over the injection area, cover towel with plastic then apply a heating pad over the entire area. You may use contrast therapy, with ice for 20 minutes immediately following the heat.  4. Relative rest should be undertaken. You may resume normal non-painful activities.        Return in about 6 weeks (around 1/28/2021) for follow-up after injection ([facility] only).    Ambulatory progress note signed and attested to by Gm Morrison D.O.

## 2020-12-17 NOTE — PATIENT INSTRUCTIONS
INSTRUCTIONS TO FOLLOW AFTER A CORTISONE INJECTION    1. The pain relieving medicine in the shot will wear off in the next 12-18 hours. After this, the pain at the site may return for a short time until the steroid takes effect 24-48 hours later.  2. Use ice tonight 30-60 minutes or longer to minimize swelling and discomfort that might follow the injection. (Either crushed ice in a plastic bag or crushed ice in an ice cap).  3. Begin heat tomorrow for at least one hour every day for one week. Place hot moist towels over the injection area, cover towel with plastic then apply a heating pad over the entire area. You may use contrast therapy, with ice for 20 minutes immediately following the heat.  4. Relative rest should be undertaken. You may resume normal non-painful activities.

## 2021-02-15 DIAGNOSIS — E78.5 HYPERLIPIDEMIA LDL GOAL <70: ICD-10-CM

## 2021-02-15 RX ORDER — ROSUVASTATIN CALCIUM 20 MG/1
20 TABLET, COATED ORAL NIGHTLY
Qty: 90 TABLET | Refills: 1 | Status: SHIPPED | OUTPATIENT
Start: 2021-02-15 | End: 2021-11-15 | Stop reason: SDUPTHER

## 2021-02-15 NOTE — TELEPHONE ENCOUNTER
Medication Refill Request    Medication:  - rosuvastatin (CRESTOR) 20 MG tablet daily    Pertinent Labs:  Lab Results   Component Value Date    GLUCOSE 86 10/06/2020    BUN 22 (H) 10/06/2020    CREATININE 0.91 10/06/2020    EGFRIFNONA 87 10/06/2020    BCR 24.2 10/06/2020    K 4.0 10/06/2020    CO2 26.3 10/06/2020    CALCIUM 9.6 10/06/2020    ALBUMIN 4.50 03/03/2020    ALKPHOS 51 03/03/2020    AST 28 03/03/2020    ALT 25 03/03/2020      Lab Results   Component Value Date    CHOL 114 03/03/2020    TRIG 98 03/03/2020    HDL 49 03/03/2020    LDL 45 03/03/2020     Lab Results   Component Value Date    HGBA1C 5.30 01/18/2020     Lab Results   Component Value Date    WBC 7.51 10/06/2020    HGB 14.4 10/06/2020    HCT 42.2 10/06/2020    MCV 94.0 10/06/2020     10/06/2020     Lab Results   Component Value Date    TSH 1.800 10/06/2020

## 2021-11-15 DIAGNOSIS — E78.5 HYPERLIPIDEMIA LDL GOAL <70: ICD-10-CM

## 2021-11-15 RX ORDER — ROSUVASTATIN CALCIUM 20 MG/1
20 TABLET, COATED ORAL NIGHTLY
Qty: 30 TABLET | Refills: 0 | Status: SHIPPED | OUTPATIENT
Start: 2021-11-15 | End: 2021-12-14 | Stop reason: SDUPTHER

## 2021-12-14 ENCOUNTER — LAB (OUTPATIENT)
Dept: LAB | Facility: HOSPITAL | Age: 56
End: 2021-12-14

## 2021-12-14 ENCOUNTER — OFFICE VISIT (OUTPATIENT)
Dept: CARDIOLOGY | Facility: CLINIC | Age: 56
End: 2021-12-14

## 2021-12-14 VITALS
OXYGEN SATURATION: 98 % | HEART RATE: 58 BPM | WEIGHT: 202.2 LBS | DIASTOLIC BLOOD PRESSURE: 78 MMHG | BODY MASS INDEX: 28.31 KG/M2 | SYSTOLIC BLOOD PRESSURE: 118 MMHG | HEIGHT: 71 IN

## 2021-12-14 DIAGNOSIS — E78.5 HYPERLIPIDEMIA LDL GOAL <70: ICD-10-CM

## 2021-12-14 DIAGNOSIS — I25.119 CORONARY ARTERY DISEASE INVOLVING NATIVE CORONARY ARTERY OF NATIVE HEART WITH ANGINA PECTORIS (HCC): Primary | ICD-10-CM

## 2021-12-14 PROBLEM — I47.29 VENTRICULAR TACHYCARDIA (PAROXYSMAL): Status: RESOLVED | Noted: 2020-01-17 | Resolved: 2021-12-14

## 2021-12-14 PROBLEM — I47.20 VENTRICULAR TACHYCARDIA (PAROXYSMAL): Status: RESOLVED | Noted: 2020-01-17 | Resolved: 2021-12-14

## 2021-12-14 LAB
ALBUMIN SERPL-MCNC: 4.9 G/DL (ref 3.5–5.2)
ALBUMIN/GLOB SERPL: 1.8 G/DL
ALP SERPL-CCNC: 62 U/L (ref 39–117)
ALT SERPL W P-5'-P-CCNC: 26 U/L (ref 1–41)
ANION GAP SERPL CALCULATED.3IONS-SCNC: 8.9 MMOL/L (ref 5–15)
AST SERPL-CCNC: 29 U/L (ref 1–40)
BILIRUB SERPL-MCNC: 0.4 MG/DL (ref 0–1.2)
BUN SERPL-MCNC: 25 MG/DL (ref 6–20)
BUN/CREAT SERPL: 22.3 (ref 7–25)
CALCIUM SPEC-SCNC: 9.9 MG/DL (ref 8.6–10.5)
CHLORIDE SERPL-SCNC: 102 MMOL/L (ref 98–107)
CHOLEST SERPL-MCNC: 150 MG/DL (ref 0–200)
CO2 SERPL-SCNC: 25.1 MMOL/L (ref 22–29)
CREAT SERPL-MCNC: 1.12 MG/DL (ref 0.76–1.27)
GFR SERPL CREATININE-BSD FRML MDRD: 68 ML/MIN/1.73
GLOBULIN UR ELPH-MCNC: 2.8 GM/DL
GLUCOSE SERPL-MCNC: 82 MG/DL (ref 65–99)
HDLC SERPL-MCNC: 66 MG/DL (ref 40–60)
LDLC SERPL CALC-MCNC: 69 MG/DL (ref 0–100)
LDLC/HDLC SERPL: 1.03 {RATIO}
POTASSIUM SERPL-SCNC: 4.5 MMOL/L (ref 3.5–5.2)
PROT SERPL-MCNC: 7.7 G/DL (ref 6–8.5)
SODIUM SERPL-SCNC: 136 MMOL/L (ref 136–145)
TRIGL SERPL-MCNC: 79 MG/DL (ref 0–150)
VLDLC SERPL-MCNC: 15 MG/DL (ref 5–40)

## 2021-12-14 PROCEDURE — 36415 COLL VENOUS BLD VENIPUNCTURE: CPT

## 2021-12-14 PROCEDURE — 80061 LIPID PANEL: CPT

## 2021-12-14 PROCEDURE — 99214 OFFICE O/P EST MOD 30 MIN: CPT | Performed by: INTERNAL MEDICINE

## 2021-12-14 PROCEDURE — 80053 COMPREHEN METABOLIC PANEL: CPT

## 2021-12-14 RX ORDER — ROSUVASTATIN CALCIUM 20 MG/1
20 TABLET, COATED ORAL NIGHTLY
Qty: 90 TABLET | Refills: 3 | Status: SHIPPED | OUTPATIENT
Start: 2021-12-14 | End: 2023-01-30 | Stop reason: SDUPTHER

## 2021-12-14 RX ORDER — NITROGLYCERIN 0.4 MG/1
0.4 TABLET SUBLINGUAL
Qty: 25 TABLET | Refills: 5 | Status: SHIPPED | OUTPATIENT
Start: 2021-12-14

## 2021-12-14 NOTE — PROGRESS NOTES
"[facility]      Identification: Manuel Sauceda is a 56 y.o. male who resides in [place], [place]. He works for [employer].    Reason for visit:  · CAD      Subjective      Manuel Sauceda presents to [facility] for followup.    Manuel returns the office today for routine follow-up.  Feels well and continues to cycle 20 miles several days a week.  He denies any angina.  He is not had blood work done recently.  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      Objective     /78 (BP Location: Left arm, Patient Position: Sitting)   Pulse 58   Ht 180.3 cm (71\")   Wt 91.7 kg (202 lb 3.2 oz)   SpO2 98%   BMI 28.20 kg/m²       Constitutional:       Appearance: Healthy appearance. Well-developed.   Eyes:      General: No scleral icterus.  HENT:      Head: Normocephalic and atraumatic.   Neck:      Vascular: No carotid bruit or JVD. JVD normal.   Pulmonary:      Effort: Pulmonary effort is normal.      Breath sounds: Normal breath sounds.   Cardiovascular:      Normal rate. Regular rhythm.      Murmurs: There is no murmur.      No gallop.   Musculoskeletal:      Extremities: No clubbing present.Skin:     General: Skin is warm and dry. There is no cyanosis.   Neurological:      Mental Status: Alert.   Psychiatric:         Attention and Perception: Attention normal.         Behavior: Behavior normal.         Result Review : No new laboratory studies to review.            Assessment     Problem List Items Addressed This Visit        Cardiology Problems    Coronary artery disease involving native coronary artery of native heart with angina pectoris (HCC) - Primary (Chronic)    Overview     · Cardiac catheterization for inferior lateral STEMI (1/17/2020): 100% thrombotic occlusion of the mid circumflex status post overlapping Xience OTONIEL.  Mild disease of LAD/RCA.  LVEF " 42%  · Echocardiogram (1/17/2020): EF 60%. Mild MR.         Current Assessment & Plan     · No angina  · Continue aspirin and statin therapy         Relevant Medications    nitroglycerin (NITROSTAT) 0.4 MG SL tablet    Hyperlipidemia LDL goal <70 (Chronic)    Overview     · High intensity statin therapy indicated given the presence of CAD         Current Assessment & Plan     · Obtain CMP and lipids today         Relevant Medications    rosuvastatin (CRESTOR) 20 MG tablet    Other Relevant Orders    Comprehensive Metabolic Panel    Lipid Panel          Plan   • Obtain CMP and lipids today      Follow-up   Return in about 1 year (around 12/14/2022).        Eran Cox MD, FACC, FSCAI  12/14/2021

## 2022-01-09 PROCEDURE — U0004 COV-19 TEST NON-CDC HGH THRU: HCPCS | Performed by: NURSE PRACTITIONER

## 2022-06-01 ENCOUNTER — TELEPHONE (OUTPATIENT)
Dept: CARDIOLOGY | Facility: CLINIC | Age: 57
End: 2022-06-01

## 2022-06-01 DIAGNOSIS — I25.119 CORONARY ARTERY DISEASE INVOLVING NATIVE CORONARY ARTERY OF NATIVE HEART WITH ANGINA PECTORIS: Primary | ICD-10-CM

## 2022-06-07 ENCOUNTER — TELEPHONE (OUTPATIENT)
Dept: CARDIOLOGY | Facility: CLINIC | Age: 57
End: 2022-06-07

## 2022-06-07 ENCOUNTER — CLINICAL SUPPORT (OUTPATIENT)
Dept: CARDIOLOGY | Facility: CLINIC | Age: 57
End: 2022-06-07
Payer: COMMERCIAL

## 2022-06-07 DIAGNOSIS — I25.119 CORONARY ARTERY DISEASE INVOLVING NATIVE CORONARY ARTERY OF NATIVE HEART WITH ANGINA PECTORIS: Primary | Chronic | ICD-10-CM

## 2022-06-07 DIAGNOSIS — I25.119 CORONARY ARTERY DISEASE INVOLVING NATIVE CORONARY ARTERY OF NATIVE HEART WITH ANGINA PECTORIS: Primary | ICD-10-CM

## 2022-06-07 PROCEDURE — 93000 ELECTROCARDIOGRAM COMPLETE: CPT | Performed by: INTERNAL MEDICINE

## 2022-06-07 NOTE — TELEPHONE ENCOUNTER
Patient came in to the office for an EKG and DOT clearance. Needing a letter that his EF was greater than 40%. Last echo 2020. Will place order for echo.

## 2022-06-16 ENCOUNTER — HOSPITAL ENCOUNTER (OUTPATIENT)
Dept: CARDIOLOGY | Facility: HOSPITAL | Age: 57
Discharge: HOME | End: 2022-06-16
Admitting: INTERNAL MEDICINE

## 2022-06-16 DIAGNOSIS — I25.119 CORONARY ARTERY DISEASE INVOLVING NATIVE CORONARY ARTERY OF NATIVE HEART WITH ANGINA PECTORIS: ICD-10-CM

## 2022-06-16 PROCEDURE — 93306 TTE W/DOPPLER COMPLETE: CPT

## 2022-06-16 PROCEDURE — 93306 TTE W/DOPPLER COMPLETE: CPT | Performed by: INTERNAL MEDICINE

## 2022-06-20 LAB
BH CV ECHO MEAS - AO MAX PG: 4 MMHG
BH CV ECHO MEAS - AO MEAN PG: 2 MMHG
BH CV ECHO MEAS - AO ROOT DIAM: 4.3 CM
BH CV ECHO MEAS - AO V2 MAX: 100 CM/SEC
BH CV ECHO MEAS - AO V2 VTI: 26.4 CM
BH CV ECHO MEAS - AVA(I,D): 2.7 CM2
BH CV ECHO MEAS - EDV(CUBED): 85.8 ML
BH CV ECHO MEAS - EDV(MOD-SP2): 235 ML
BH CV ECHO MEAS - EDV(MOD-SP4): 161 ML
BH CV ECHO MEAS - EF(MOD-BP): 61 %
BH CV ECHO MEAS - EF(MOD-SP2): 60 %
BH CV ECHO MEAS - EF(MOD-SP4): 61.5 %
BH CV ECHO MEAS - ESV(CUBED): 27.7 ML
BH CV ECHO MEAS - ESV(MOD-SP2): 94 ML
BH CV ECHO MEAS - ESV(MOD-SP4): 62 ML
BH CV ECHO MEAS - FS: 31.4 %
BH CV ECHO MEAS - IVS/LVPW: 0.97 CM
BH CV ECHO MEAS - IVSD: 1.12 CM
BH CV ECHO MEAS - LA DIMENSION: 3.6 CM
BH CV ECHO MEAS - LV DIASTOLIC VOL/BSA (35-75): 76.3 CM2
BH CV ECHO MEAS - LV MASS(C)D: 178.4 GRAMS
BH CV ECHO MEAS - LV MAX PG: 2.8 MMHG
BH CV ECHO MEAS - LV MEAN PG: 1 MMHG
BH CV ECHO MEAS - LV SYSTOLIC VOL/BSA (12-30): 29.4 CM2
BH CV ECHO MEAS - LV V1 MAX: 84.2 CM/SEC
BH CV ECHO MEAS - LV V1 VTI: 20.2 CM
BH CV ECHO MEAS - LVIDD: 4.4 CM
BH CV ECHO MEAS - LVIDS: 3 CM
BH CV ECHO MEAS - LVOT AREA: 3.5 CM2
BH CV ECHO MEAS - LVOT DIAM: 2.1 CM
BH CV ECHO MEAS - LVPWD: 1.16 CM
BH CV ECHO MEAS - MV A MAX VEL: 45.4 CM/SEC
BH CV ECHO MEAS - MV DEC SLOPE: 321 CM/SEC2
BH CV ECHO MEAS - MV DEC TIME: 0.15 MSEC
BH CV ECHO MEAS - MV E MAX VEL: 60.2 CM/SEC
BH CV ECHO MEAS - MV E/A: 1.33
BH CV ECHO MEAS - MV MAX PG: 2.41 MMHG
BH CV ECHO MEAS - MV MEAN PG: 1 MMHG
BH CV ECHO MEAS - MV P1/2T: 65 MSEC
BH CV ECHO MEAS - MV V2 VTI: 33.5 CM
BH CV ECHO MEAS - MVA(P1/2T): 3.4 CM2
BH CV ECHO MEAS - MVA(VTI): 2.09 CM2
BH CV ECHO MEAS - PA ACC SLOPE: 423 CM/SEC2
BH CV ECHO MEAS - PA ACC TIME: 0.14 SEC
BH CV ECHO MEAS - PA PR(ACCEL): 15.1 MMHG
BH CV ECHO MEAS - RAP SYSTOLE: 3 MMHG
BH CV ECHO MEAS - RVSP: 16 MMHG
BH CV ECHO MEAS - SI(MOD-SP2): 66.9 ML/M2
BH CV ECHO MEAS - SI(MOD-SP4): 46.9 ML/M2
BH CV ECHO MEAS - SV(LVOT): 70 ML
BH CV ECHO MEAS - SV(MOD-SP2): 141 ML
BH CV ECHO MEAS - SV(MOD-SP4): 99 ML
BH CV ECHO MEAS - TAPSE (>1.6): 2.6 CM
BH CV ECHO MEAS - TR MAX PG: 13 MMHG
BH CV ECHO MEAS - TR MAX VEL: 180 CM/SEC
BH CV VAS BP LEFT ARM: NORMAL MMHG
BH CV XLRA - RV BASE: 4.5 CM
BH CV XLRA - RV LENGTH: 6.4 CM
BH CV XLRA - RV MID: 3.5 CM
BH CV XLRA - TDI S': 11.8 CM/SEC
LV EF 2D ECHO EST: 60 %
MAXIMAL PREDICTED HEART RATE: 166 BPM
STRESS TARGET HR: 141 BPM

## 2023-01-30 DIAGNOSIS — E78.5 HYPERLIPIDEMIA LDL GOAL <70: ICD-10-CM

## 2023-01-30 RX ORDER — ROSUVASTATIN CALCIUM 20 MG/1
20 TABLET, COATED ORAL NIGHTLY
Qty: 90 TABLET | Refills: 0 | Status: SHIPPED | OUTPATIENT
Start: 2023-01-30

## 2023-03-27 NOTE — PROGRESS NOTES
Cardiology Outpatient Visit      Identification: Manuel Sauceda is a 57 y.o. male who resides in [place], [place]    Reason for visit:  Coronary artery disease involving native coronary       Subjective      Patient is a 56 y/o gentleman who returns today for follow up of coronary artery disease and cardiac risk factors.  Since his last visit he has done well without any hospitalizations or new medical diagnoses.  He remains physically active and runs 2-1/2 miles 3 times a week.  At he also rows 2 times a week, bikes 20 to 50 miles a week and does yoga daily.  He was very shocked when he had his heart attack back in 2020 because he leads such a healthy lifestyle but he does have a family history of heart disease.  He is on daily aspirin and also takes statin therapy.  He admits he is not very good at taking his Crestor because he often forgets in the evening.  He is not on any beta-blockers due to baseline bradycardia.  Heart rates are normal without antihypertensive medications.      Review of Systems   Constitutional: Negative for malaise/fatigue.   Eyes: Negative for vision loss in left eye and vision loss in right eye.   Cardiovascular: Negative for chest pain, dyspnea on exertion, near-syncope, orthopnea, palpitations, paroxysmal nocturnal dyspnea and syncope.   Musculoskeletal: Negative for myalgias.   Neurological: Negative for brief paralysis, excessive daytime sleepiness, focal weakness, numbness, paresthesias and weakness.   All other systems reviewed and are negative.      Allergies   Allergen Reactions   • Erythromycin Nausea And Vomiting         Current Outpatient Medications   Medication Instructions   • aspirin 81 mg, Oral, Daily   • nitroglycerin (NITROSTAT) 0.4 mg, Sublingual, Every 5 Minutes PRN, Take no more than 3 doses in 15 minutes.   • rosuvastatin (CRESTOR) 20 mg, Oral, Nightly         Objective     /76 (BP Location: Right arm, Patient Position: Sitting)   Pulse 50   Ht 180.3  "cm (71\")   Wt 97.5 kg (215 lb)   SpO2 99%   BMI 29.99 kg/m²       Constitutional:       Appearance: Healthy appearance. Well-developed.   Eyes:      General: Lids are normal. No scleral icterus.     Conjunctiva/sclera: Conjunctivae normal.   HENT:      Head: Normocephalic and atraumatic.   Neck:      Thyroid: No thyromegaly.      Vascular: No carotid bruit or JVD.   Pulmonary:      Effort: Pulmonary effort is normal.      Breath sounds: Normal breath sounds. No wheezing. No rhonchi. No rales.   Cardiovascular:      Normal rate. Regular rhythm.      Murmurs: There is no murmur.      No gallop. No rub.   Pulses:     Intact distal pulses.   Edema:     Peripheral edema absent.   Abdominal:      General: There is no distension.      Palpations: Abdomen is soft. There is no abdominal mass.   Musculoskeletal:      Cervical back: Normal range of motion. Skin:     General: Skin is warm and dry.      Findings: No rash.   Neurological:      General: No focal deficit present.      Mental Status: Alert and oriented to person, place, and time.      Gait: Gait is intact.   Psychiatric:         Attention and Perception: Attention normal.         Mood and Affect: Mood normal.         Behavior: Behavior normal.         Result Review  (reviewed with patient):            Lab Results   Component Value Date    GLUCOSE 82 12/14/2021    BUN 25 (H) 12/14/2021    CREATININE 1.12 12/14/2021    BCR 22.3 12/14/2021    K 4.5 12/14/2021    CO2 25.1 12/14/2021    CALCIUM 9.9 12/14/2021    ALBUMIN 4.90 12/14/2021    BILITOT 0.4 12/14/2021    AST 29 12/14/2021    ALT 26 12/14/2021     Lab Results   Component Value Date    WBC 7.51 10/06/2020    HGB 14.4 10/06/2020    HCT 42.2 10/06/2020    MCV 94.0 10/06/2020     10/06/2020     Lab Results   Component Value Date    CHOL 150 12/14/2021    TRIG 79 12/14/2021    HDL 66 (H) 12/14/2021    LDL 69 12/14/2021     Lab Results   Component Value Date    HGBA1C 5.30 01/18/2020           Assessment "     Diagnoses and all orders for this visit:    1. Coronary artery disease involving native coronary artery of native heart with angina pectoris (HCC) (Primary)  Overview:  · Cardiac catheterization for inferior lateral STEMI (1/17/2020): 100% thrombotic occlusion of the mid circumflex status post overlapping Xience OTONIEL.  Mild disease of LAD/RCA.  LVEF 42%  · Echocardiogram (1/17/2020): EF 60%. Mild MR.    Assessment & Plan:  · Continue aspirin 81 mg daily      2. Hyperlipidemia LDL goal <70  Overview:  · High intensity statin therapy indicated given the presence of CAD    Assessment & Plan:  · Continue Crestor 20 mg daily    Orders:  -     Lipid Panel; Future  -     Comprehensive Metabolic Panel; Future        Plan   • Explained the importance of Crestor for prevention of future MI.  He can take this in the a.m. if it helps him to remember  • Obtain CMP and lipid profile today  • Defer beta-blockers due to baseline bradycardia.  • Continue active lifestyle      Follow-up   Return in about 1 year (around 3/30/2024), or if symptoms worsen or fail to improve, for Follow-up with Dr. Cox next visit.        Kathi Bonilla APRN  3/30/2023

## 2023-03-30 ENCOUNTER — OFFICE VISIT (OUTPATIENT)
Dept: CARDIOLOGY | Facility: CLINIC | Age: 58
End: 2023-03-30
Payer: COMMERCIAL

## 2023-03-30 ENCOUNTER — LAB (OUTPATIENT)
Dept: LAB | Facility: HOSPITAL | Age: 58
End: 2023-03-30
Payer: COMMERCIAL

## 2023-03-30 VITALS
HEIGHT: 71 IN | WEIGHT: 215 LBS | SYSTOLIC BLOOD PRESSURE: 116 MMHG | OXYGEN SATURATION: 99 % | DIASTOLIC BLOOD PRESSURE: 76 MMHG | BODY MASS INDEX: 30.1 KG/M2 | HEART RATE: 50 BPM

## 2023-03-30 DIAGNOSIS — E78.5 HYPERLIPIDEMIA LDL GOAL <70: Chronic | ICD-10-CM

## 2023-03-30 DIAGNOSIS — I25.119 CORONARY ARTERY DISEASE INVOLVING NATIVE CORONARY ARTERY OF NATIVE HEART WITH ANGINA PECTORIS: Primary | Chronic | ICD-10-CM

## 2023-03-30 LAB
ALBUMIN SERPL-MCNC: 4.7 G/DL (ref 3.5–5.2)
ALBUMIN/GLOB SERPL: 2.2 G/DL
ALP SERPL-CCNC: 56 U/L (ref 39–117)
ALT SERPL W P-5'-P-CCNC: 29 U/L (ref 1–41)
ANION GAP SERPL CALCULATED.3IONS-SCNC: 11.1 MMOL/L (ref 5–15)
AST SERPL-CCNC: 27 U/L (ref 1–40)
BILIRUB SERPL-MCNC: 0.6 MG/DL (ref 0–1.2)
BUN SERPL-MCNC: 18 MG/DL (ref 6–20)
BUN/CREAT SERPL: 19.4 (ref 7–25)
CALCIUM SPEC-SCNC: 9.3 MG/DL (ref 8.6–10.5)
CHLORIDE SERPL-SCNC: 103 MMOL/L (ref 98–107)
CHOLEST SERPL-MCNC: 126 MG/DL (ref 0–200)
CO2 SERPL-SCNC: 23.9 MMOL/L (ref 22–29)
CREAT SERPL-MCNC: 0.93 MG/DL (ref 0.76–1.27)
EGFRCR SERPLBLD CKD-EPI 2021: 97 ML/MIN/1.73
GLOBULIN UR ELPH-MCNC: 2.1 GM/DL
GLUCOSE SERPL-MCNC: 93 MG/DL (ref 65–99)
HDLC SERPL-MCNC: 52 MG/DL (ref 40–60)
LDLC SERPL CALC-MCNC: 60 MG/DL (ref 0–100)
LDLC/HDLC SERPL: 1.17 {RATIO}
POTASSIUM SERPL-SCNC: 4.3 MMOL/L (ref 3.5–5.2)
PROT SERPL-MCNC: 6.8 G/DL (ref 6–8.5)
SODIUM SERPL-SCNC: 138 MMOL/L (ref 136–145)
TRIGL SERPL-MCNC: 66 MG/DL (ref 0–150)
VLDLC SERPL-MCNC: 14 MG/DL (ref 5–40)

## 2023-03-30 PROCEDURE — 99214 OFFICE O/P EST MOD 30 MIN: CPT | Performed by: NURSE PRACTITIONER

## 2023-03-30 PROCEDURE — 80053 COMPREHEN METABOLIC PANEL: CPT

## 2023-03-30 PROCEDURE — 36415 COLL VENOUS BLD VENIPUNCTURE: CPT

## 2023-03-30 PROCEDURE — 80061 LIPID PANEL: CPT

## 2023-04-06 ENCOUNTER — DOCUMENTATION (OUTPATIENT)
Dept: CARDIOLOGY | Facility: CLINIC | Age: 58
End: 2023-04-06
Payer: COMMERCIAL

## 2023-04-06 NOTE — PROGRESS NOTES
I contacted Manuel to let him know the results of his blood work were excellent.  He was not available but I left a voicemail.  He can contact our office with further questions.  No new recommendations.    BEATRIZ Lee

## 2023-05-01 DIAGNOSIS — E78.5 HYPERLIPIDEMIA LDL GOAL <70: ICD-10-CM

## 2023-05-01 RX ORDER — ROSUVASTATIN CALCIUM 20 MG/1
20 TABLET, COATED ORAL NIGHTLY
Qty: 90 TABLET | Refills: 3 | Status: SHIPPED | OUTPATIENT
Start: 2023-05-01

## 2023-11-28 ENCOUNTER — OFFICE VISIT (OUTPATIENT)
Dept: FAMILY MEDICINE CLINIC | Facility: CLINIC | Age: 58
End: 2023-11-28
Payer: COMMERCIAL

## 2023-11-28 VITALS
BODY MASS INDEX: 30.85 KG/M2 | SYSTOLIC BLOOD PRESSURE: 112 MMHG | WEIGHT: 220.4 LBS | DIASTOLIC BLOOD PRESSURE: 78 MMHG | HEIGHT: 71 IN

## 2023-11-28 DIAGNOSIS — H93.12 TINNITUS OF LEFT EAR: Primary | ICD-10-CM

## 2023-11-28 DIAGNOSIS — Z12.11 COLON CANCER SCREENING: ICD-10-CM

## 2023-11-28 RX ORDER — FLUTICASONE PROPIONATE 50 MCG
1 SPRAY, SUSPENSION (ML) NASAL DAILY
Qty: 16 G | Refills: 11 | Status: SHIPPED | OUTPATIENT
Start: 2023-11-28

## 2023-11-28 NOTE — PROGRESS NOTES
"Established Patient Office Visit      Patient Name: Manuel Sauceda  : 1965   MRN: [id number]   Care Team: Patient Care Team:  Gm Morrison DO as PCP - General (Urgent Care)    Chief Complaint:    Chief Complaint   Patient presents with    Tinnitus     Pt co un-painful \"ringing\" in left ear       History of Present Illness: Manuel Sauceda is a 58 y.o. male who is here today for chief complaint.    Tinnitus  Pertinent negatives include no abdominal pain, coughing, headaches, neck pain or vomiting.   Earache   There is pain in the left ear. This is a new problem. The current episode started 1 to 4 weeks ago. The problem occurs constantly. The problem has been waxing and waning. There has been no fever. The pain is at a severity of 0/10. Pertinent negatives include no abdominal pain, coughing, diarrhea, ear discharge, headaches, hearing loss, neck pain or vomiting.     He flew from [place] to [place] about 3 weeks ago, shortly before his symptoms began.  Symptoms are unilateral.  He reports that it started as a lower or, has evolved into more of a high-pitched tinnitus over the last week.  He states that the rubor is absent today.  He denies any pain throughout, no drainage, dizziness.  No new medications or supplements.    This patient is accompanied by their self who contributes to the history of their care.    The following portions of the patient's history were reviewed and updated as appropriate: allergies, current medications, past family history, past medical history, past social history, past surgical history and problem list.    Subjective      Review of Systems:   Review of Systems   HENT:  Positive for ear pain and tinnitus. Negative for ear discharge and hearing loss.    Respiratory:  Negative for cough.    Gastrointestinal:  Negative for abdominal pain, diarrhea and vomiting.   Musculoskeletal:  Negative for neck pain.    - See HPI    Past Medical History:   Past Medical " History:   Diagnosis Date    Coronary artery disease involving native coronary artery of native heart with angina pectoris 2020    Hyperlipidemia LDL goal <70 2020    Myocardial infarction     Ventricular tachycardia (paroxysmal) 2020    Nonsustained VT following STEMI, 2020 24 day event monitor (2020): Normal monitor. 1% PACs, 1% PVCs. Heart block occurred 3 times, the most severe was first degree, slowest 36 BPM.       Past Surgical History:   Past Surgical History:   Procedure Laterality Date    CARDIAC CATHETERIZATION      CARDIAC CATHETERIZATION N/A 2020    Procedure: Left Heart Cath;  Surgeon: Shakeel Cox IV, MD;  Location:  [facility];  Service: Cardiology    CARDIAC CATHETERIZATION  2020    Procedure: Percutaneous Mechanical Thrombectomy;  Surgeon: Shakeel Cox IV, MD;  Location:  [facility];  Service: Cardiology    CARDIAC CATHETERIZATION N/A 2020    Procedure: Stent OTONIEL coronary;  Surgeon: Shakeel Cox IV, MD;  Location:  [facility];  Service: Cardiology    CORONARY STENT PLACEMENT  2020    ROTATOR CUFF REPAIR Left        Family History:   Family History   Problem Relation Age of Onset    Heart attack Father 60    Hyperlipidemia Father     Heart attack Brother 45    Thyroid disease Sister     Thyroid disease Daughter        Social History:   Social History     Socioeconomic History    Marital status:    Tobacco Use    Smoking status: Former     Packs/day: 2.00     Years: 20.00     Additional pack years: 0.00     Total pack years: 40.00     Types: Cigarettes     Quit date: 2015     Years since quittin.8    Smokeless tobacco: Never   Vaping Use    Vaping Use: Never used   Substance and Sexual Activity    Alcohol use: Yes     Alcohol/week: 4.0 standard drinks of alcohol     Types: 4 Cans of beer per week    Drug use: No    Sexual activity: Yes      "Partners: Female       Tobacco History:   Social History     Tobacco Use   Smoking Status Former    Packs/day: 2.00    Years: 20.00    Additional pack years: 0.00    Total pack years: 40.00    Types: Cigarettes    Quit date: 2015    Years since quittin.8   Smokeless Tobacco Never       Medications:     Current Outpatient Medications:     aspirin 81 MG chewable tablet, Chew 1 tablet Daily., Disp: 90 tablet, Rfl: 0    nitroglycerin (NITROSTAT) 0.4 MG SL tablet, Place 1 tablet under the tongue Every 5 (Five) Minutes As Needed for Chest Pain. Take no more than 3 doses in 15 minutes., Disp: 25 tablet, Rfl: 5    rosuvastatin (CRESTOR) 20 MG tablet, Take 1 tablet by mouth Every Night., Disp: 90 tablet, Rfl: 3    fluticasone (FLONASE) 50 MCG/ACT nasal spray, 1 spray into the nostril(s) as directed by provider Daily., Disp: 16 g, Rfl: 11    Allergies:   Allergies   Allergen Reactions    Erythromycin Nausea And Vomiting       Objective   Objective     Physical Exam:  Vital Signs:   Vitals:    23 1006   BP: 112/78   BP Location: Left arm   Patient Position: Sitting   Cuff Size: Adult   Weight: 100 kg (220 lb 6.4 oz)   Height: 180.3 cm (71\")     Body mass index is 30.74 kg/m².     Physical Exam  Nursing note reviewed  Const: NAD, A&Ox4, Pleasant, Cooperative  Eyes: EOMI, no conjunctivitis  ENT: Bilateral middle ear effusion with air-fluid levels, no other abnormalities or TM perforations  Procedures/Radiology     Procedures  No radiology results for the last 7 days     Assessment & Plan   Assessment / Plan      Assessment/Plan:   Problems Addressed This Visit  Diagnoses and all orders for this visit:    1. Tinnitus of left ear (Primary)  -     fluticasone (FLONASE) 50 MCG/ACT nasal spray; 1 spray into the nostril(s) as directed by provider Daily.  Dispense: 16 g; Refill: 11    2. Colon cancer screening  -     Cologuard - Stool, Per Rectum; Future      Problem List Items Addressed This Visit    None  Visit " Diagnoses       Tinnitus of left ear    -  Primary    Relevant Medications    fluticasone (FLONASE) 50 MCG/ACT nasal spray    Colon cancer screening        Relevant Orders    Cologuard - Stool, Per Rectum            Differential includes eustachian tube dysfunction/OME, barotrauma, hearing loss.  Malignancy would be very unlikely but if his symptoms persist would consider further imaging.  He is on aspirin but at a very low dose and no change in his dosage, and with his unilateral symptoms would feel this is less likely as well.  Would recommend treating eustachian dysfunction with Flonase 2 sprays for the next week, if his symptoms improve he can follow-up as needed otherwise he will let me know and we can go from there.    There are no Patient Instructions on file for this visit.    Follow Up:   Return in about 6 months (around 5/28/2024) for Annual.      DO ESTUARDO [address] RD  [facility]  [address]  [facility] [address]  Fax [phone]  Phone [phone]

## 2024-05-07 ENCOUNTER — OFFICE VISIT (OUTPATIENT)
Dept: CARDIOLOGY | Facility: CLINIC | Age: 59
End: 2024-05-07
Payer: COMMERCIAL

## 2024-05-07 VITALS
HEIGHT: 71 IN | SYSTOLIC BLOOD PRESSURE: 120 MMHG | DIASTOLIC BLOOD PRESSURE: 76 MMHG | OXYGEN SATURATION: 97 % | WEIGHT: 208.8 LBS | HEART RATE: 60 BPM | BODY MASS INDEX: 29.23 KG/M2

## 2024-05-07 DIAGNOSIS — I25.10 CORONARY ARTERY DISEASE INVOLVING NATIVE CORONARY ARTERY OF NATIVE HEART WITHOUT ANGINA PECTORIS: Primary | ICD-10-CM

## 2024-05-07 DIAGNOSIS — R19.5 POSITIVE COLORECTAL CANCER SCREENING USING COLOGUARD TEST: ICD-10-CM

## 2024-05-07 DIAGNOSIS — E78.5 HYPERLIPIDEMIA LDL GOAL <70: ICD-10-CM

## 2024-05-07 PROCEDURE — 99214 OFFICE O/P EST MOD 30 MIN: CPT | Performed by: INTERNAL MEDICINE

## 2024-05-07 PROCEDURE — 93000 ELECTROCARDIOGRAM COMPLETE: CPT | Performed by: INTERNAL MEDICINE

## 2024-05-07 RX ORDER — ROSUVASTATIN CALCIUM 20 MG/1
20 TABLET, COATED ORAL NIGHTLY
Qty: 90 TABLET | Refills: 3 | Status: SHIPPED | OUTPATIENT
Start: 2024-05-07

## 2024-05-07 RX ORDER — ASPIRIN 81 MG/1
81 TABLET ORAL DAILY
Qty: 90 TABLET | Refills: 3 | Status: SHIPPED | OUTPATIENT
Start: 2024-05-07

## 2024-05-07 RX ORDER — NITROGLYCERIN 0.4 MG/1
0.4 TABLET SUBLINGUAL
Qty: 25 TABLET | Refills: 5 | Status: SHIPPED | OUTPATIENT
Start: 2024-05-07

## 2024-05-08 PROCEDURE — 93000 ELECTROCARDIOGRAM COMPLETE: CPT | Performed by: INTERNAL MEDICINE

## 2024-06-24 RX ORDER — SODIUM, POTASSIUM,MAG SULFATES 17.5-3.13G
2 SOLUTION, RECONSTITUTED, ORAL ORAL TAKE AS DIRECTED
Qty: 354 ML | Refills: 0 | Status: SHIPPED | OUTPATIENT
Start: 2024-06-24

## 2024-07-08 ENCOUNTER — OUTSIDE FACILITY SERVICE (OUTPATIENT)
Dept: GASTROENTEROLOGY | Facility: CLINIC | Age: 59
End: 2024-07-08
Payer: COMMERCIAL

## 2024-07-08 PROCEDURE — 45381 COLONOSCOPY SUBMUCOUS NJX: CPT | Performed by: INTERNAL MEDICINE

## 2024-07-08 PROCEDURE — 45385 COLONOSCOPY W/LESION REMOVAL: CPT | Performed by: INTERNAL MEDICINE

## 2025-04-11 ENCOUNTER — HOSPITAL ENCOUNTER (OUTPATIENT)
Facility: HOSPITAL | Age: 60
Discharge: HOME | End: 2025-04-11
Admitting: INTERNAL MEDICINE
Payer: COMMERCIAL

## 2025-04-11 VITALS — WEIGHT: 208 LBS | BODY MASS INDEX: 29.12 KG/M2 | HEIGHT: 71 IN

## 2025-04-11 DIAGNOSIS — I25.10 CORONARY ARTERY DISEASE INVOLVING NATIVE CORONARY ARTERY OF NATIVE HEART WITHOUT ANGINA PECTORIS: ICD-10-CM

## 2025-04-11 DIAGNOSIS — R00.1 BRADYCARDIA: ICD-10-CM

## 2025-04-11 LAB
AORTIC DIMENSIONLESS INDEX: 0.81 (DI)
ASCENDING AORTA: 3.2 CM
AV MEAN PRESS GRAD SYS DOP V1V2: 2 MMHG
AV VMAX SYS DOP: 91.9 CM/SEC
BH CV ECHO MEAS - AO MAX PG: 3.4 MMHG
BH CV ECHO MEAS - AO ROOT DIAM: 4.3 CM
BH CV ECHO MEAS - AO V2 VTI: 22.2 CM
BH CV ECHO MEAS - AVA(I,D): 4 CM2
BH CV ECHO MEAS - EDV(CUBED): 157.5 ML
BH CV ECHO MEAS - EDV(MOD-SP2): 102 ML
BH CV ECHO MEAS - EDV(MOD-SP4): 123 ML
BH CV ECHO MEAS - EF(MOD-SP2): 54.1 %
BH CV ECHO MEAS - EF(MOD-SP4): 59.5 %
BH CV ECHO MEAS - ESV(CUBED): 42.9 ML
BH CV ECHO MEAS - ESV(MOD-SP2): 46.8 ML
BH CV ECHO MEAS - ESV(MOD-SP4): 49.8 ML
BH CV ECHO MEAS - FS: 35.2 %
BH CV ECHO MEAS - IVS/LVPW: 1 CM
BH CV ECHO MEAS - IVSD: 1 CM
BH CV ECHO MEAS - LA DIMENSION: 3.7 CM
BH CV ECHO MEAS - LAT PEAK E' VEL: 11.9 CM/SEC
BH CV ECHO MEAS - LV DIASTOLIC VOL/BSA (35-75): 57.4 CM2
BH CV ECHO MEAS - LV MASS(C)D: 206.7 GRAMS
BH CV ECHO MEAS - LV MAX PG: 2.7 MMHG
BH CV ECHO MEAS - LV MEAN PG: 1 MMHG
BH CV ECHO MEAS - LV SYSTOLIC VOL/BSA (12-30): 23.3 CM2
BH CV ECHO MEAS - LV V1 MAX: 82.4 CM/SEC
BH CV ECHO MEAS - LV V1 VTI: 18 CM
BH CV ECHO MEAS - LVIDD: 5.4 CM
BH CV ECHO MEAS - LVIDS: 3.5 CM
BH CV ECHO MEAS - LVOT AREA: 4.9 CM2
BH CV ECHO MEAS - LVOT DIAM: 2.5 CM
BH CV ECHO MEAS - LVPWD: 1 CM
BH CV ECHO MEAS - MED PEAK E' VEL: 7.8 CM/SEC
BH CV ECHO MEAS - MV A MAX VEL: 40.7 CM/SEC
BH CV ECHO MEAS - MV DEC SLOPE: 171 CM/SEC2
BH CV ECHO MEAS - MV DEC TIME: 0.26 SEC
BH CV ECHO MEAS - MV E MAX VEL: 44.7 CM/SEC
BH CV ECHO MEAS - MV E/A: 1.1
BH CV ECHO MEAS - MV MAX PG: 1.21 MMHG
BH CV ECHO MEAS - MV MEAN PG: 0 MMHG
BH CV ECHO MEAS - MV V2 VTI: 24.3 CM
BH CV ECHO MEAS - MVA(VTI): 3.6 CM2
BH CV ECHO MEAS - PA ACC TIME: 0.15 SEC
BH CV ECHO MEAS - PA V2 MAX: 112 CM/SEC
BH CV ECHO MEAS - PI END-D VEL: 112 CM/SEC
BH CV ECHO MEAS - RAP SYSTOLE: 3 MMHG
BH CV ECHO MEAS - RVSP: 21 MMHG
BH CV ECHO MEAS - SV(LVOT): 88.1 ML
BH CV ECHO MEAS - SV(MOD-SP2): 55.2 ML
BH CV ECHO MEAS - SV(MOD-SP4): 73.2 ML
BH CV ECHO MEAS - SVI(LVOT): 41.1 ML/M2
BH CV ECHO MEAS - SVI(MOD-SP2): 25.8 ML/M2
BH CV ECHO MEAS - SVI(MOD-SP4): 34.2 ML/M2
BH CV ECHO MEAS - TAPSE (>1.6): 2.2 CM
BH CV ECHO MEAS - TR MAX PG: 18.4 MMHG
BH CV ECHO MEAS - TR MAX VEL: 214.5 CM/SEC
BH CV ECHO MEASUREMENTS AVERAGE E/E' RATIO: 4.54
BH CV VAS BP LEFT ARM: NORMAL MMHG
BH CV XLRA - RV BASE: 3.7 CM
BH CV XLRA - RV LENGTH: 8.4 CM
BH CV XLRA - RV MID: 3.4 CM
BH CV XLRA - TDI S': 12 CM/SEC
IVRT: 116 MS
LEFT ATRIUM VOLUME INDEX: 33.4 ML/M2
LV EF BIPLANE MOD: 56.3 %

## 2025-04-11 PROCEDURE — 93306 TTE W/DOPPLER COMPLETE: CPT

## 2025-04-11 PROCEDURE — 93306 TTE W/DOPPLER COMPLETE: CPT | Performed by: INTERNAL MEDICINE

## 2025-04-28 ENCOUNTER — RESULTS FOLLOW-UP (OUTPATIENT)
Dept: CARDIOLOGY | Facility: CLINIC | Age: 60
End: 2025-04-28
Payer: COMMERCIAL

## 2025-04-28 NOTE — LETTER
"Manuel Sauceda  [address] [address]    April 28, 2025     Dear Mr. Sauceda:    Your echocardiogram that we ordered for DOT clearance was reviewed. I think it is \"okay\", although one of the cords of the mitral valve appears torn. Fortunately this does not affect the function of the valve. There is nothing that needs to be done for this. You should let us know if you develop shortness of breath to suggest worsening mitral regurgitation or fevers to suggest infection. We will make no changes to your medication or plan at this time.      If you have any questions or concerns, please don't hesitate to call.         Sincerely,        MD Cecelia Paul IV, RN BSN      "

## 2025-04-28 NOTE — PROGRESS NOTES
"Please let him know that I looked at his echocardiogram that we ordered for DOT clearance.  I think it is \"okay\", although one of the cords of the mitral valve appears torn.  Fortunately this does not affect the function of the valve.  There is nothing that needs to be done for this.  He should let us know if he develops shortness of breath to suggest worsening mitral regurgitation or fevers to suggest infection  "

## (undated) DEVICE — Device

## (undated) DEVICE — CATH ASPIR EXPORT AP .014IN 6F140CM

## (undated) DEVICE — CATH DIAG EXPO .056 FL3.5 6F 100CM

## (undated) DEVICE — GUIDE CATHETER: Brand: MACH1™

## (undated) DEVICE — KT CATH INDIGO RX ASP 140CM

## (undated) DEVICE — TREK CORONARY DILATATION CATHETER 2.50 MM X 15 MM / RAPID-EXCHANGE: Brand: TREK

## (undated) DEVICE — MODEL BT2000 P/N 700287-012KIT CONTENTS: MANIFOLD WITH SALINE AND CONTRAST PORTS, SALINE TUBING WITH SPIKE AND HAND SYRINGE, TRANSDUCER: Brand: BT2000 AUTOMATED MANIFOLD KIT

## (undated) DEVICE — Device: Brand: ASAHI SION BLUE

## (undated) DEVICE — GW INQWIRE FC PTFE STD J/1.5 .035 260

## (undated) DEVICE — MODEL AT P65, P/N 701554-001KIT CONTENTS: HAND CONTROLLER, 3-WAY HIGH-PRESSURE STOPCOCK WITH ROTATING END AND PREMIUM HIGH-PRESSURE TUBING: Brand: ANGIOTOUCH® KIT

## (undated) DEVICE — PK CATH CARD 10

## (undated) DEVICE — BALN NC/EUPHORA RX 4.00X20MM

## (undated) DEVICE — CATH DIAG EXPO M/ PK 6FR FL4/FR4 PIG 3PK

## (undated) DEVICE — GLIDESHEATH BASIC HYDROPHILIC COATED INTRODUCER SHEATH: Brand: GLIDESHEATH

## (undated) DEVICE — DEV COMP RAD PRELUDESYNC 24CM

## (undated) DEVICE — DEV INFL MONARCH 25W

## (undated) DEVICE — NC TREK CORONARY DILATATION CATHETER 5.0 MM X 12 MM / RAPID-EXCHANGE: Brand: NC TREK

## (undated) DEVICE — NC TREK CORONARY DILATATION CATHETER 3.5 MM X 20 MM / RAPID-EXCHANGE: Brand: NC TREK